# Patient Record
Sex: MALE | Race: WHITE
[De-identification: names, ages, dates, MRNs, and addresses within clinical notes are randomized per-mention and may not be internally consistent; named-entity substitution may affect disease eponyms.]

---

## 2019-11-30 ENCOUNTER — HOSPITAL ENCOUNTER (EMERGENCY)
Dept: HOSPITAL 75 - ER | Age: 39
Discharge: HOME | End: 2019-11-30
Payer: COMMERCIAL

## 2019-11-30 VITALS — SYSTOLIC BLOOD PRESSURE: 175 MMHG | DIASTOLIC BLOOD PRESSURE: 110 MMHG

## 2019-11-30 VITALS — HEIGHT: 70.87 IN | WEIGHT: 149.91 LBS | BODY MASS INDEX: 20.99 KG/M2

## 2019-11-30 DIAGNOSIS — Z79.4: ICD-10-CM

## 2019-11-30 DIAGNOSIS — J18.9: Primary | ICD-10-CM

## 2019-11-30 DIAGNOSIS — E10.9: ICD-10-CM

## 2019-11-30 LAB
ALBUMIN SERPL-MCNC: 3 GM/DL (ref 3.2–4.5)
ALP SERPL-CCNC: 86 U/L (ref 40–136)
ALT SERPL-CCNC: 40 U/L (ref 0–55)
BASOPHILS # BLD AUTO: 0 10^3/UL (ref 0–0.1)
BASOPHILS NFR BLD AUTO: 1 % (ref 0–10)
BILIRUB SERPL-MCNC: 0.4 MG/DL (ref 0.1–1)
BUN/CREAT SERPL: 11
CALCIUM SERPL-MCNC: 8.5 MG/DL (ref 8.5–10.1)
CHLORIDE SERPL-SCNC: 109 MMOL/L (ref 98–107)
CO2 SERPL-SCNC: 18 MMOL/L (ref 21–32)
CREAT SERPL-MCNC: 2.84 MG/DL (ref 0.6–1.3)
EOSINOPHIL # BLD AUTO: 0.1 10^3/UL (ref 0–0.3)
EOSINOPHIL NFR BLD AUTO: 1 % (ref 0–10)
ERYTHROCYTE [DISTWIDTH] IN BLOOD BY AUTOMATED COUNT: 14.2 % (ref 10–14.5)
GFR SERPLBLD BASED ON 1.73 SQ M-ARVRAT: 25 ML/MIN
GLUCOSE SERPL-MCNC: 48 MG/DL (ref 70–105)
HCT VFR BLD CALC: 28 % (ref 40–54)
HGB BLD-MCNC: 9.2 G/DL (ref 13.3–17.7)
LYMPHOCYTES # BLD AUTO: 1.7 X 10^3 (ref 1–4)
LYMPHOCYTES NFR BLD AUTO: 20 % (ref 12–44)
MANUAL DIFFERENTIAL PERFORMED BLD QL: NO
MCH RBC QN AUTO: 28 PG (ref 25–34)
MCHC RBC AUTO-ENTMCNC: 33 G/DL (ref 32–36)
MCV RBC AUTO: 86 FL (ref 80–99)
MONOCYTES # BLD AUTO: 0.7 X 10^3 (ref 0–1)
MONOCYTES NFR BLD AUTO: 9 % (ref 0–12)
NEUTROPHILS # BLD AUTO: 6 X 10^3 (ref 1.8–7.8)
NEUTROPHILS NFR BLD AUTO: 70 % (ref 42–75)
PLATELET # BLD: 265 10^3/UL (ref 130–400)
PMV BLD AUTO: 9.8 FL (ref 7.4–10.4)
POTASSIUM SERPL-SCNC: 5.4 MMOL/L (ref 3.6–5)
PROT SERPL-MCNC: 5.9 GM/DL (ref 6.4–8.2)
SODIUM SERPL-SCNC: 137 MMOL/L (ref 135–145)
WBC # BLD AUTO: 8.6 10^3/UL (ref 4.3–11)

## 2019-11-30 PROCEDURE — 85025 COMPLETE CBC W/AUTO DIFF WBC: CPT

## 2019-11-30 PROCEDURE — 96374 THER/PROPH/DIAG INJ IV PUSH: CPT

## 2019-11-30 PROCEDURE — 36415 COLL VENOUS BLD VENIPUNCTURE: CPT

## 2019-11-30 PROCEDURE — 71046 X-RAY EXAM CHEST 2 VIEWS: CPT

## 2019-11-30 PROCEDURE — 94640 AIRWAY INHALATION TREATMENT: CPT

## 2019-11-30 PROCEDURE — 80053 COMPREHEN METABOLIC PANEL: CPT

## 2019-11-30 PROCEDURE — 82962 GLUCOSE BLOOD TEST: CPT

## 2019-11-30 NOTE — ED COUGH/URI
General


Chief Complaint:  Cough/Cold/Flu Symptoms


Stated Complaint:  COUGH / CONGESTION / BACK PAIN


Nursing Triage Note:  


THE PT IS AMBULATORY TO THE ROOM WITHOUT DIFFICULTY. 











NO DISTRESS IS SEEN ON ARRIVAL. 











LOC IS NORMAL FOR THE PT. 











THE PT C/O OF  COUGH AND CHEST CONGESTION.


Sepsis Screen:  No Definite Risk


Source:  patient


Exam Limitations:  no limitations





History of Present Illness


Date Seen by Provider:  Nov 30, 2019


Time Seen by Provider:  17:29


Initial Comments


Cough congestion and back pain for a couple of days. Type I diabetic wearing an 

insulin pump.


Timing/Duration:  week, getting worse


Severity/Quality:  dry cough


Prior Episodes/Possible Cause:  occasional episodes


Modifying Factors:  Improves With Coughing


Associated Symptoms:  denies symptoms





Allergies and Home Medications


Allergies


Coded Allergies:  


     No Known Drug Allergies (Unverified , 11/30/19)





Patient Home Medication List


Home Medication List Reviewed:  Yes





Review of Systems


Review of Systems


Constitutional:  see HPI


EENTM:  see HPI


Respiratory:  see HPI, cough


Genitourinary:  no symptoms reported


Musculoskeletal:  no symptoms reported


Skin:  no symptoms reported


Psychiatric/Neurological:  No Symptoms Reported





Past Medical-Social-Family Hx


Patient Social History


Recent Foreign Travel:  No


Contact w/Someone Who Travel:  No


Recent Infectious Disease Expo:  No


Recent Hopitalizations:  No


Physical Abuse:  No


Sexual Abuse:  No


Mistreated:  No


Fear:  No





Seasonal Allergies


Seasonal Allergies:  No





Past Medical History


Surgeries:  No





Physical Exam





Vital Signs - First Documented








 11/30/19 11/30/19





 16:15 18:05


 


Temp 36.8 


 


Pulse 117 


 


Resp 16 


 


B/P (MAP) 175/105 (128) 


 


Pulse Ox  99


 


O2 Delivery  Room Air





Capillary Refill : Less Than 3 Seconds


Height: '"


Weight: lbs. oz. kg; 20.00 BMI


Method:


General Appearance:  WD/WN, no apparent distress


Eyes:  Bilateral Eye Normal Inspection, Bilateral Eye PERRL, Bilateral Eye EOMI


HEENT:  PERRL/EOMI, normal ENT inspection


Respiratory:  no respiratory distress, no accessory muscle use, other (crackles 

posterior left base, wheezing anterior right upper lobe)


Cardiovascular:  no murmur, tachycardia


Gastrointestinal:  normal bowel sounds, non tender, soft


Neurologic/Psychiatric:  alert, normal mood/affect, oriented x 3


Skin:  normal color, warm/dry





Progress/Results/Core Measures


Suspected Sepsis


Recent Fever Within 48 Hours:  No


Infection Criteria Present:  None


New/Unexplained  Altered Menta:  No


Sepsis Screen:  No Definite Risk


SIRS


Temperature: 


Pulse: 117 


Respiratory Rate: 16


 


Laboratory Tests


11/30/19 17:22: White Blood Count 8.6


Blood Pressure 175 /105 


Mean: 128


 


Laboratory Tests


11/30/19 17:22: 


Creatinine 2.84H, Platelet Count 265, Total Bilirubin 0.4








Results/Orders


Lab Results





Laboratory Tests








Test


 11/30/19


17:22 Range/Units


 


 


White Blood Count


 8.6 


 4.3-11.0


10^3/uL


 


Red Blood Count


 3.27 L


 4.35-5.85


10^6/uL


 


Hemoglobin 9.2 L 13.3-17.7  G/DL


 


Hematocrit 28 L 40-54  %


 


Mean Corpuscular Volume 86  80-99  FL


 


Mean Corpuscular Hemoglobin 28  25-34  PG


 


Mean Corpuscular Hemoglobin


Concent 33 


 32-36  G/DL





 


Red Cell Distribution Width 14.2  10.0-14.5  %


 


Platelet Count


 265 


 130-400


10^3/uL


 


Mean Platelet Volume 9.8  7.4-10.4  FL


 


Neutrophils (%) (Auto) 70  42-75  %


 


Lymphocytes (%) (Auto) 20  12-44  %


 


Monocytes (%) (Auto) 9  0-12  %


 


Eosinophils (%) (Auto) 1  0-10  %


 


Basophils (%) (Auto) 1  0-10  %


 


Neutrophils # (Auto) 6.0  1.8-7.8  X 10^3


 


Lymphocytes # (Auto) 1.7  1.0-4.0  X 10^3


 


Monocytes # (Auto) 0.7  0.0-1.0  X 10^3


 


Eosinophils # (Auto)


 0.1 


 0.0-0.3


10^3/uL


 


Basophils # (Auto)


 0.0 


 0.0-0.1


10^3/uL


 


Sodium Level 137  135-145  MMOL/L


 


Potassium Level 5.4 H 3.6-5.0  MMOL/L


 


Chloride Level 109 H   MMOL/L


 


Carbon Dioxide Level 18 L 21-32  MMOL/L


 


Anion Gap 10  5-14  MMOL/L


 


Blood Urea Nitrogen 31 H 7-18  MG/DL


 


Creatinine


 2.84 H


 0.60-1.30


MG/DL


 


Estimat Glomerular Filtration


Rate 25 


  





 


BUN/Creatinine Ratio 11   


 


Glucose Level 48 *L   MG/DL


 


Calcium Level 8.5  8.5-10.1  MG/DL


 


Corrected Calcium 9.3  8.5-10.1  MG/DL


 


Total Bilirubin 0.4  0.1-1.0  MG/DL


 


Aspartate Amino Transf


(AST/SGOT) 35 H


 5-34  U/L





 


Alanine Aminotransferase


(ALT/SGPT) 40 


 0-55  U/L





 


Alkaline Phosphatase 86    U/L


 


Total Protein 5.9 L 6.4-8.2  GM/DL


 


Albumin 3.0 L 3.2-4.5  GM/DL








My Orders





Orders - ROWENA JACOME APRN


Cbc With Automated Diff (11/30/19 17:24)


Comprehensive Metabolic Panel (11/30/19 17:24)


Ed Iv/Invasive Line Start (11/30/19 17:24)


Chest Pa/Lat (2 View) (11/30/19 17:24)


Albuterol/Ipra Inhalation Soln (Duoneb I (11/30/19 17:30)


Svn Small Volume Nebulizer (11/30/19 17:24)


Ns Iv 1000 Ml (Sodium Chloride 0.9%) (11/30/19 17:30)


Cho 60g/M 1snack ( [place]) (12/1/19 Lunch)


Rocephin 1 Gm Iv (1x Dose) (11/30/19 18:30)





Medications Given in ED





Current Medications








 Medications  Dose


 Ordered  Sig/Tiffanie


 Route  Start Time


 Stop Time Status Last Admin


Dose Admin


 


 Albuterol/


 Ipratropium  3 ml  ONCE  ONCE


 INH  11/30/19 17:30


 11/30/19 17:31 DC 11/30/19 18:04


3 ML








Vital Signs/I&O











 11/30/19 11/30/19





 16:15 18:05


 


Temp 36.8 


 


Pulse 117 


 


Resp 16 


 


B/P (MAP) 175/105 (128) 


 


Pulse Ox  99


 


O2 Delivery  Room Air





Capillary Refill : Less Than 3 Seconds








Blood Pressure Mean:                    128


POS








Departure


Impression





   Primary Impression:  


   Pneumonia


   Qualified Codes:  J18.1 - Lobar pneumonia, unspecified organism


Disposition:  01 HOME, SELF-CARE


Condition:  Stable





Departure-Patient Inst.


Decision time for Depature:  18:26


Referrals:  


[facility] (PCP/Family)


Primary Care Physician


Patient Instructions:  Pneumonia, Adult (DC)





Add. Discharge Instructions:  


1. Antibiotics as directed


2. Return to ER for any concerns


3. All discharge instructions reviewed with patient and/or family. Voiced und

erstanding.


Scripts


Albuterol Sulfate (PROAIR HFA) 1 Puff Puff


2 PUFF IH Q4H PRN for WEAKNESS, #1 PUFF


   1 PUFF = 90 MCG


   Prov: ROWENA JACOME         11/30/19 


Azithromycin (Azithromycin) 250 Mg Tablet


250 MG PO UD, #6 TAB


   TAKE 2 TABLETS ON DAY ONE THEN TAKE 1 TABLET DAILY FOR FOUR MORE DAYS


   Prov: ROWENA JACOME         11/30/19 


Amoxicillin/Potassium Clav (Augmentin 875-125 Tablet) 1 Each Tablet


1 EACH PO BID, #14 TAB 0 Refills


   Prov: ROWENA JACOME         11/30/19











ROWENA JACOME             Nov 30, 2019 17:30


POS

## 2019-11-30 NOTE — DIAGNOSTIC IMAGING REPORT
INDICATION: Cough and congestion.



PA and lateral chest.



Heart size and pulmonary vascularity are normal. Lungs are clear.

There are no effusions or pneumothoraces.



IMPRESSION: Negative chest.



Dictated by: 



  Dictated on workstation # [id number]

## 2019-12-26 NOTE — XMS REPORT
[facility]

                             Created on: 2018



Jose Flores

External Reference #: [id number]

: 1980

Sex: Female



Demographics





                          Address                   [address] [place]  [address]

 

                          Preferred Language        Unknown

 

                          Marital Status            Unknown

 

                          Jew Affiliation     Unknown

 

                          Race                      Unknown

 

                          Ethnic Group              Unknown





Author





                          Author                    Jose CARLIN

 

                          Organization              [facility]

 

                          Address                   [address] [place]  [address]



 

                          Phone                     [phone]







Care Team Providers





                    Care Team Member Name Role                Phone

 

                    INSHA CARLIN     Unavailable         [phone]







PROBLEMS





          Type      Condition ICD9-CM Code RBI30-WW Code Onset Dates Condition S

tatus SNOMED 

Code

 

          Problem   High risk sexual behavior           Z72.51              Acti

ve    [id number]

 

          Problem   Tinea versicolor           B36.0               Active    564

28369

 

           Problem    Other specified diabetes mellitus without complications   

         E13.9                 Active

                                        [id number]

 

           Problem    Erectile dysfunction, unspecified erectile dysfunction typ

e            N52.9                 

Active                                  [id number]

 

          Problem   Hyperlipidemia, unspecified hyperlipidemia type           E7

8.5               Active    

[id number]







ALLERGIES

No Information



ENCOUNTERS





                Encounter       Location        Date            Diagnosis

 

                          [facility]     [id number]

[address] [place] [phone]

                          07 Mar, 2018               

 

                          [facility]     [address] [id number]

[address] [place] [phone]

                                         

 

                          [facility]     [address] [id number]

[address] [place] [phone]

                                         

 

                          [facility]     [address]

[address] [place] [phone]

                                         

 

                          [facility]     [address]

[address] [place] [phone]

                          22 Dec, 2017               

 

                          [facility]     [address] [id number]

[address] [place] [phone]

                          07 Dec, 2017               

 

                          [facility]     [address] [id number]

[address] [place] [phone]

                                         

 

                          [facility]     [address] [id number]

[address] [place] [phone]

                          15 Nov, 2017              Tinea versicolor B36.0 ; Enc

ounter for immunization Z23 ; Other 

specified diabetes mellitus without complications E13.9 and High risk sexual 
behavior Z72.51

 

                          [facility]     [address] [id number]

[address] [place] [phone]

                          16 Oct, 2017               

 

                          [facility]     [address] [id number]

[address] [place] [phone]

                          22 Sep, 2017               

 

                          [facility]     [address] [id number]

[address] [place] [phone]

                          19 Sep, 2017               

 

                          [facility]     [address] [id number]

[address] [place] [phone]

                          19 Sep, 2017               

 

                          [facility]     [address] [id number]

[address] [place] [phone]

                          08 Sep, 2017               

 

                          [facility]     [address] [id number]

[address] [place] [phone]

                          31 Aug, 2017               

 

                          [facility]     [address] [id number]

[address] [place] [phone]

                          23 Aug, 2017               

 

                          [facility]     [address] [id number]

[address] [place] [phone]

                          23 Aug, 2017              Tinea versicolor B36.0 ; Oth

er specified diabetes mellitus without 

complications E13.9 and Acute pain of right shoulder M25.511

 

                          [facility]     [address] [id number]

[address] [place] [phone]

                          17 Aug, 2017               

 

                          [facility]     [address] [id number]

[address] [place] [phone]

                          17 Aug, 2017               

 

                          [facility]     [address] [id number]

[address] [place] [phone]

                                         

 

                          [facility]     [address] [id number]

[address] [place] [phone]

                                         

 

                          [facility]     [address] [id number]

[address] [place] [phone]

                                         

 

                          [facility]     [address] [id number]

[address] [place] [phone]

                                         

 

                          [facility]     [address] [facility] [id number]

[address] [place] [phone]

                                         

 

                          [facility]     [address] [facility] [id number]

[address] [place] [phone]

                                        Other specified diabetes steven

litus without complications E13.9 ; 

Erectile dysfunction, unspecified erectile dysfunction type N52.9 ; 
Hyperlipidemia, unspecified hyperlipidemia type E78.5 ; Tinea versicolor B36.0 
and High risk sexual behavior Z72.51

 

                          [facility]     [address] [facility] [id number]

[address] [place] [phone]

                                         







IMMUNIZATIONS

No Known Immunizations



SOCIAL HISTORY

Never Assessed



REASON FOR VISIT

PALS IN-[place]



PLAN OF CARE





VITAL SIGNS





MEDICATIONS

Unknown Medications



RESULTS

No Results



PROCEDURES

No Known procedures



INSTRUCTIONS





MEDICATIONS ADMINISTERED

No Known Medications



MEDICAL (GENERAL) HISTORY





                    Type                Description         Date

 

                    Medical History     diabetes mellitus    

 

                    Medical History     hyperlipidemia       

 

                    Medical History     erectile dysfunction  

 

                    Medical History     anxiety attacks      

 

                    Medical History     depression           

 

                    Surgical History    tonsillectomy and adenoidectomy 

 

                    Surgical History    wisdom teeth extraction  

 

                    Surgical History    Lumpectomy on Tongue  

 

                    Surgical History    mole removal -back   

 

                    Hospitalization History Flu- Hospitalized at [facility] in 

[place], [place]

## 2019-12-26 NOTE — XMS REPORT
[facility]

                             Created on: 2018



Jose Flores

External Reference #: [id number]

: 1980

Sex: Female



Demographics





                          Address                   [address] [place]  [address]

 

                          Preferred Language        Unknown

 

                          Marital Status            Unknown

 

                          Mandaen Affiliation     Unknown

 

                          Race                      Unknown

 

                          Ethnic Group              Unknown





Author





                          Author                    Jose CARLIN

 

                          Organization              [facility]

 

                          Address                   [address] [place]  [address]



 

                          Phone                     [phone]







Care Team Providers





                    Care Team Member Name Role                Phone

 

                    NISHA CARLIN     Unavailable         [phone]







PROBLEMS





          Type      Condition ICD9-CM Code VCP65-AF Code Onset Dates Condition S

tatus SNOMED 

Code

 

          Problem   High risk sexual behavior           Z72.51              Acti

ve    [id number]

 

          Problem   Tinea versicolor           B36.0               Active    564

87633

 

           Problem    Other specified diabetes mellitus without complications   

         E13.9                 Active

                                        [id number]

 

           Problem    Erectile dysfunction, unspecified erectile dysfunction typ

e            N52.9                 

Active                                  [id number]

 

          Problem   Hyperlipidemia, unspecified hyperlipidemia type           E7

8.5               Active    

[id number]







ALLERGIES

No Information



ENCOUNTERS





                Encounter       Location        Date            Diagnosis

 

                          [facility]     [address] [id number]

[address] [place] [phone]

                          07 Mar, 2018               

 

                          [facility]     [address] [id number]

[address] [place] [phone]

                                         

 

                          [facility]     [address] [id number]

[address] [place] [phone]

                                         

 

                          [facility]     [address]

[address] [place] [phone]

                                         

 

                          [facility]     [address] [id number]

[address] [place] [phone]

                          22 Dec, 2017               

 

                          [facility]     [address] [id number]

[address] [place] [phone]

                          07 Dec, 2017               

 

                          [facility]     [address] [id number]

[address] [place] [phone]

                                         

 

                          [facility]     [address] [id number]

[address] [place] [phone]

                          15 Nov, 2017              Tinea versicolor B36.0 ; Enc

ounter for immunization Z23 ; Other 

specified diabetes mellitus without complications E13.9 and High risk sexual 
behavior Z72.51

 

                          [facility]     [address] [id number]

[address] [place] [phone]

                          16 Oct, 2017               

 

                          [facility]     [address] [id number]

[address] [place] [phone]

                          22 Sep, 2017               

 

                          [facility]     [address] [id number]

[address] [place] [phone]

                          19 Sep, 2017               

 

                          [facility]     [address] [id number]

[address] [place] [phone]

                          19 Sep, 2017               

 

                          [facility]     [address] [id number]

[address] [place] [phone]

                          08 Sep, 2017               

 

                          [facility]     [address] [id number]

[address] [place] [phone]

                          31 Aug, 2017               

 

                          [facility]     [address] [id number]

[address] [place] [phone]

                          23 Aug, 2017               

 

                          [facility]     [address] [id number]

[address] [place] [phone]

                          23 Aug, 2017              Tinea versicolor B36.0 ; Oth

er specified diabetes mellitus without 

complications E13.9 and Acute pain of right shoulder M25.511

 

                          [facility]     [address] [id number]

[address] [place] [phone]

                          17 Aug, 2017               

 

                          [facility]     [address] [id number]

[address] [place] [phone]

                          17 Aug, 2017               

 

                          [facility]     [address] [id number]

[address] [place] [phone]

                                         

 

                          [facility]     [address] [id number]

[address] [place] [phone]

                                         

 

                          [facility]     [address] [id number]

[address] [place] [phone]

                                         

 

                          [facility]     [address] [id number]

[address] [place] [phone]

                                         

 

                          [facility]     [address] [facility] [id number]

[address] [place] [phone]

                                         

 

                          [facility]     [address] [facility] [id number]

[address] [place] [phone]

                                        Other specified diabetes steven

litus without complications E13.9 ; 

Erectile dysfunction, unspecified erectile dysfunction type N52.9 ; 
Hyperlipidemia, unspecified hyperlipidemia type E78.5 ; Tinea versicolor B36.0 
and High risk sexual behavior Z72.51

 

                          [facility]     [address] [facility] [id number]

[address] [place] [phone]

                                         







IMMUNIZATIONS

No Known Immunizations



SOCIAL HISTORY

Never Assessed



REASON FOR VISIT

eye exam



PLAN OF CARE





VITAL SIGNS





MEDICATIONS

Unknown Medications



RESULTS

No Results



PROCEDURES

No Known procedures



INSTRUCTIONS





MEDICATIONS ADMINISTERED

No Known Medications



MEDICAL (GENERAL) HISTORY





                    Type                Description         Date

 

                    Medical History     diabetes mellitus    

 

                    Medical History     hyperlipidemia       

 

                    Medical History     erectile dysfunction  

 

                    Medical History     anxiety attacks      

 

                    Medical History     depression           

 

                    Surgical History    tonsillectomy and adenoidectomy 

 

                    Surgical History    wisdom teeth extraction  

 

                    Surgical History    Lumpectomy on Tongue  

 

                    Surgical History    mole removal -back   

 

                    Hospitalization History Flu- Hospitalized at [facility] in 

[place], [place]

## 2019-12-26 NOTE — XMS REPORT
Continuity of Care Document

                             Created on: 2019



Jose Mcmullen

External Reference #: [id number]

: 1980

Sex: Male



Demographics





                          Address                   [address] [place] [address]  [address]

 

                          Home Phone                [phone] x

 

                          Preferred Language        Unknown

 

                          Marital Status            Unknown

 

                          Episcopalian Affiliation     Unknown

 

                          Race                      Unknown

 

                          Ethnic Group              Unknown





Author





                          Organization              Unknown

 

                          Address                   Unknown

 

                          Phone                     Unavailable



              



Allergies

      



             Active           Description           Code           Type         

  Severity   

                Reaction           Onset           Reported/Identified          

 

Relationship to Patient                 Clinical Status        

 

                Yes             No Known Drug Allergies           [id number]    

       Drug 

Allergy           Unknown           N/A                             2019  

      

                                                             



                  



Medications

      



There is no data.                  



Problems

      



             Date Dx Coded           Attending           Type           Code    

       

Diagnosis                               Diagnosed By        

 

             2019           ROWENA JACOME           Ot           E10

.9           

TYPE 1 DIABETES MELLITUS WITHOUT COMPLIC                    

 

             2019           ROWENA JACOME           Ot           J18

.9           

PNEUMONIA, UNSPECIFIED ORGANISM                    

 

             2019           ROWENA JACOME           Ot           R05

           

COUGH                                            

 

             2019           ROWENA JACOME           Ot           Z79

.4           

LONG TERM (CURRENT) USE OF INSULIN                    



                        



Procedures

      



There is no data.                  



Results

      



                    Test                Result              Range        

 

                                        Complete blood count (CBC) with automate

d white blood cell (WBC) differential - 

19 17:22         

 

                          Blood leukocytes automated count (number/volume)      

     8.6 10*3/uL          

                                        4.3-11.0        

 

                          Blood erythrocytes automated count (number/volume)    

       3.27 10*6/uL       

                                        4.35-5.85        

 

                    Venous blood hemoglobin measurement (mass/volume)           

9.2 g/dL            

13.3-17.7        

 

                    Blood hematocrit (volume fraction)           28 %           

     40-54        

 

                    Automated erythrocyte mean corpuscular volume           86 [

foz_us]           

80-99        

 

                                        Automated erythrocyte mean corpuscular h

emoglobin (mass per erythrocyte)        

                          28 pg                     25-34        

 

                                        Automated erythrocyte mean corpuscular h

emoglobin concentration measurement 

(mass/volume)             33 g/dL                   32-36        

 

                    Automated erythrocyte distribution width ratio           14.

2 %              10.0-

14.5        

 

                    Automated blood platelet count (count/volume)           265 

10*3/uL           

130-400        

 

                          Automated blood platelet mean volume measurement      

     9.8 [foz_us]         

                                        7.4-10.4        

 

                    Automated blood neutrophils/100 leukocytes           70 %   

             42-75       

 

 

                    Automated blood lymphocytes/100 leukocytes           20 %   

             12-44       

 

 

                    Blood monocytes/100 leukocytes           9 %                

 0-12        

 

                    Automated blood eosinophils/100 leukocytes           1 %    

             0-10        

 

                    Automated blood basophils/100 leukocytes           1 %      

           0-10        

 

                    Blood neutrophils automated count (number/volume)           

6.0 10*3            

1.8-7.8        

 

                    Blood lymphocytes automated count (number/volume)           

1.7 10*3            

1.0-4.0        

 

                    Blood monocytes automated count (number/volume)           0.

7 10*3            

0.0-1.0        

 

                    Automated eosinophil count           0.1 10*3/uL           0

.0-0.3        

 

                    Automated blood basophil count (count/volume)           0.0 

10*3/uL           

0.0-0.1        

 

                                        Comprehensive metabolic panel - 19

 17:22         

 

                          Serum or plasma sodium measurement (moles/volume)     

      137 mmol/L          

                                        135-145        

 

                          Serum or plasma potassium measurement (moles/volume)  

         5.4 mmol/L       

                                        3.6-5.0        

 

                          Serum or plasma chloride measurement (moles/volume)   

        109 mmol/L        

                                                

 

                    Carbon dioxide           18 mmol/L           21-32        

 

                          Serum or plasma anion gap determination (moles/volume)

           10 mmol/L      

                                        5-14        

 

                          Serum or plasma urea nitrogen measurement (mass/volume

)           31 mg/dL      

                                        7-18        

 

                          Serum or plasma creatinine measurement (mass/volume)  

         2.84 mg/dL       

                                        0.60-1.30        

 

                    Serum or plasma urea nitrogen/creatinine mass ratio         

  11                  NRG 

       

 

                                        Serum or plasma creatinine measurement w

ith calculation of estimated glomerular 

filtration rate           25                        NRG        

 

                    Serum or plasma glucose measurement (mass/volume)           

48 mg/dL            

        

 

                    Serum or plasma calcium measurement (mass/volume)           

8.5 mg/dL           

8.5-10.1        

 

                          Serum or plasma total bilirubin measurement (mass/volu

me)           0.4 mg/dL   

                                        0.1-1.0        

 

                                        Serum or plasma alkaline phosphatase emy

surement (enzymatic activity/volume)    

                          86 U/L                            

 

                                        Serum or plasma aspartate aminotransfera

se measurement (enzymatic 

activity/volume)           35 U/L                    5-34        

 

                                        Serum or plasma alanine aminotransferase

 measurement (enzymatic activity/volume)

                          40 U/L                    0-55        

 

                    Serum or plasma protein measurement (mass/volume)           

5.9 g/dL            

6.4-8.2        

 

                    Serum or plasma albumin measurement (mass/volume)           

3.0 g/dL            

3.2-4.5        

 

                    CALCIUM CORRECTED           9.3 mg/dL           8.5-10.1    

    

 

                                        Capillary blood glucose measurement by g

lucometer (mass/volume) - 19 19:34

         

 

                          Capillary blood glucose measurement by glucometer (mas

s/volume)           62 

mg/dL                                           

 

                                        Capillary blood glucose measurement by g

lucometer (mass/volume) - 19 20:07

         

 

                          Capillary blood glucose measurement by glucometer (mas

s/volume)           98 

mg/dL                                           

 

                                        CBC - 19 15:04         

 

                    WHITE BLOOD CELL COUNT           8.8 Thousand/uL           3

.8-10.8        

 

                    RED BLOOD CELL COUNT           3.11 Million/uL           4.2

0-5.80        

 

                    HEMOGLOBIN           9.0 g/dL            13.2-17.1        

 

                    HEMATOCRIT           27.8 %              38.5-50.0        

 

                    MCV                 89.4 fL             80.0-100.0        

 

                    MCH                 28.9 pg             27.0-33.0        

 

                    MCHC                32.4 g/dL           32.0-36.0        

 

                    RDW                 14.3 %              11.0-15.0        

 

                    PLATELET COUNT           339 Thousand/uL           140-400  

      

 

                    MPV                 10.5 fL             7.5-12.5        

 

                    ABSOLUTE NEUTROPHILS           4981 cells/uL           1500-

7800        

 

                    ABSOLUTE LYMPHOCYTES           2561 cells/uL           850-3

900        

 

                    ABSOLUTE MONOCYTES           651 cells/uL           200-950 

       

 

                    ABSOLUTE EOSINOPHILS           510 cells/uL           

        

 

                    ABSOLUTE BASOPHILS           97 cells/uL           0-200    

    

 

                    NEUTROPHILS           56.6 %              NRG        

 

                    LYMPHOCYTES           29.1 %              NRG        

 

                    MONOCYTES           7.4 %               NRG        

 

                    EOSINOPHILS           5.8 %               NRG        

 

                    BASOPHILS           1.1 %               NRG        



                        



Encounters

      



                ACCT No.           Visit Date/Time           Discharge          

 Status         

             Pt. Type           Provider           Facility           Loc./Unit 

          

Complaint        

 

             [id number]           2019 10:00:00                        [facility]    

       

Outpatient           REMINGTON PINZON, NISHA HENRY [facility]                                             

 

             [id number]           2019 13:20:00                              

       Document

 Registration                                                                   

 

 

                    [id number]           2019 16:00:00           

019 20:21:00        

                DIS             Outpatient           ROWENA JACOME        

   [facility]           ER                        COUGH / CONGESTION / BA

CK PAIN

## 2019-12-26 NOTE — XMS REPORT
[facility]

                             Created on: 2018



Jose Flores

External Reference #: [id number]

: 1980

Sex: Female



Demographics





                          Address                   [address] [place]  [address]

 

                          Preferred Language        Unknown

 

                          Marital Status            Unknown

 

                          Gnosticism Affiliation     Unknown

 

                          Race                      Unknown

 

                          Ethnic Group              Unknown





Author





                          Author                    Jose CARLIN

 

                          Organization              [facility]

 

                          Address                   [address] [place]  [address]



 

                          Phone                     [phone]







Care Team Providers





                    Care Team Member Name Role                Phone

 

                    NISHA CARLIN     Unavailable         [phone]







PROBLEMS





          Type      Condition ICD9-CM Code YPV20-VZ Code Onset Dates Condition S

tatus SNOMED 

Code

 

          Problem   High risk sexual behavior           Z72.51              Acti

ve    [id number]

 

          Problem   Tinea versicolor           B36.0               Active    [id number]

[id number]

 

           Problem    Other specified diabetes mellitus without complications   

         E13.9                 Active

                                        [id number]

 

           Problem    Erectile dysfunction, unspecified erectile dysfunction typ

e            N52.9                 

Active                                  [id number]

 

          Problem   Hyperlipidemia, unspecified hyperlipidemia type           E7

8.5               Active    

[id number]







ALLERGIES

No Information



ENCOUNTERS





                Encounter       Location        Date            Diagnosis

 

                          [facility]     [id number]

[address] [place] [phone]

                          07 Mar, 2018               

 

                          [facility]     [id number]

[address] [place] [phone]

                                         

 

                          [facility]     [address] [id number]

[address] [place] [phone]

                                         

 

                          [facility]     [id number]

[address] [place] [phone]

                                         

 

                          [facility]     [address] [id number]

[address] [place] [phone]

                          22 Dec, 2017               

 

                          [facility]     [id number]

[address] [place] [phone]

                          07 Dec, 2017               

 

                          [facility]     [address] [id number]

[address] [place] [phone]

                                         

 

                          [facility]     [address] [id number]

[address] [place] [phone]

                          15 Nov, 2017              Tinea versicolor B36.0 ; Enc

ounter for immunization Z23 ; Other 

specified diabetes mellitus without complications E13.9 and High risk sexual 
behavior Z72.51

 

                          [facility]     [address] [id number]

[address] [place] [phone]

                          16 Oct, 2017               

 

                          [facility]     [address] [id number]

[address] [place] [phone]

                          22 Sep, 2017               

 

                          [facility]     [address] [id number]

[address] [place] [phone]

                          19 Sep, 2017               

 

                          [facility]     [address] [id number]

[address] [place] [phone]

                          19 Sep, 2017               

 

                          [facility]     [address] [id number]

[address] [place] [phone]

                          08 Sep, 2017               

 

                          [facility]     [address] [id number]

[address] [place] [phone]

                          31 Aug, 2017               

 

                          [facility]     [address] [id number]

[address] [place] [phone]

                          23 Aug, 2017               

 

                          [facility]     [address] [id number]

[address] [place] [phone]

                          23 Aug, 2017              Tinea versicolor B36.0 ; Oth

er specified diabetes mellitus without 

complications E13.9 and Acute pain of right shoulder M25.511

 

                          [facility]     [address] [id number]

[address] [place] [phone]

                          17 Aug, 2017               

 

                          [facility]     [address] [id number]

[address] [place] [phone]

                          17 Aug, 2017               

 

                          [facility]     [address] [id number]

[address] [place] [phone]

                                         

 

                          [facility]     [address] [id number]

[address] [place] [phone]

                                         

 

                          [facility]     [address] [id number]

[address] [place] [phone]

                                         

 

                          [facility]     [address] [id number]

[address] [place] [phone]

                                         

 

                          [facility]     [address] [facility] [id number]

[address] [place] [phone]

                                         

 

                          [facility]     [address] [facility] [id number]

[address] [place] [phone]

                                        Other specified diabetes steven

litus without complications E13.9 ; 

Erectile dysfunction, unspecified erectile dysfunction type N52.9 ; 
Hyperlipidemia, unspecified hyperlipidemia type E78.5 ; Tinea versicolor B36.0 
and High risk sexual behavior Z72.51

 

                          [facility]     [address] [facility] [id number]

[address] [place] [phone]

                                         







IMMUNIZATIONS

No Known Immunizations



SOCIAL HISTORY

Never Assessed



REASON FOR VISIT

PALS IN-[employer]



PLAN OF CARE





VITAL SIGNS





MEDICATIONS

Unknown Medications



RESULTS

No Results



PROCEDURES

No Known procedures



INSTRUCTIONS





MEDICATIONS ADMINISTERED

No Known Medications



MEDICAL (GENERAL) HISTORY





                    Type                Description         Date

 

                    Medical History     diabetes mellitus    

 

                    Medical History     hyperlipidemia       

 

                    Medical History     erectile dysfunction  

 

                    Medical History     anxiety attacks      

 

                    Medical History     depression           

 

                    Surgical History    tonsillectomy and adenoidectomy 

 

                    Surgical History    wisdom teeth extraction  

 

                    Surgical History    Lumpectomy on Tongue  

 

                    Surgical History    mole removal -back   

 

                    Hospitalization History Flu- Hospitalized at [facility] in 

[place], [place]

## 2019-12-26 NOTE — XMS REPORT
[facility]

                             Created on: 2018



Jose Flores

External Reference #: [id number]

: 1980

Sex: Female



Demographics





                          Address                   [address] [place]  [address]

 

                          Preferred Language        Unknown

 

                          Marital Status            Unknown

 

                          Sabianist Affiliation     Unknown

 

                          Race                      Unknown

 

                          Ethnic Group              Unknown





Author





                          Author                    Jose CARLIN

 

                          Organization              [facility]

 

                          Address                   [address] [place]  [address]



 

                          Phone                     [phone]







Care Team Providers





                    Care Team Member Name Role                Phone

 

                    NISHA CARLIN     Unavailable         [phone]







PROBLEMS





          Type      Condition ICD9-CM Code AYN25-KA Code Onset Dates Condition S

tatus SNOMED 

Code

 

          Problem   High risk sexual behavior           Z72.51              Acti

ve    [id number]

 

          Problem   Tinea versicolor           B36.0               Active    564

05744

 

           Problem    Other specified diabetes mellitus without complications   

         E13.9                 Active

                                        [id number]

 

           Problem    Erectile dysfunction, unspecified erectile dysfunction typ

e            N52.9                 

Active                                  [id number]

 

          Problem   Hyperlipidemia, unspecified hyperlipidemia type           E7

8.5               Active    

[id number]







ALLERGIES

No Information



ENCOUNTERS





                Encounter       Location        Date            Diagnosis

 

                          [facility]     [address] [id number]

[address] [place] [address]

                          07 Mar, 2018               

 

                          [facility]     [address] [id number]

[address] [place] [address]

                                         

 

                          [facility]     [address] [id number]

[address] [place] [address]

                                         

 

                          [facility]     [address] [id number]

[address] [place] [address]

                                         

 

                          [facility]     [address] [id number]

[address] [place] [address]

                          22 Dec, 2017               

 

                          [facility]     [address] [id number]

[address] [place] [address]

                          07 Dec, 2017               

 

                          [facility]     [address] [id number]

[address] [place] [address]

                                         

 

                          [facility]     [address] [id number]

[address] [place] [address]

                          15 Nov, 2017              Tinea versicolor B36.0 ; Enc

ounter for immunization Z23 ; Other 

specified diabetes mellitus without complications E13.9 and High risk sexual 
behavior Z72.51

 

                          [facility]     [address] [id number]

[address] [place] [phone]

                          16 Oct, 2017               

 

                          [facility]     [address] [id number]

[address] [place] [phone]

                          22 Sep, 2017               

 

                          [facility]     [address] [id number]

[address] [place] [phone]

                          19 Sep, 2017               

 

                          [facility]     [address] [id number]

[address] [place] [address]

                          19 Sep, 2017               

 

                          [facility]     [address] [id number]

[address] [place] [address]

                          08 Sep, 2017               

 

                          [facility]     [address] [id number]

[address] [place] [address]

                          31 Aug, 2017               

 

                          [facility]     [address] [id number]

[address] [place] [phone]

                          23 Aug, 2017               

 

                          [facility]     [address] [id number]

[address] [place] [address]

                          23 Aug, 2017              Tinea versicolor B36.0 ; Oth

er specified diabetes mellitus without 

complications E13.9 and Acute pain of right shoulder M25.511

 

                          [facility]     [address] [id number]

[address] [place] [address]

                          17 Aug, 2017               

 

                          [facility]     [address] [id number]

[address] [place] [address]

                          17 Aug, 2017               

 

                          [facility]     [address] [id number]

[address] [place] [phone]

                                         

 

                          [facility]     [address] [id number]

[address] [place] [address]

                                         

 

                          [facility]     [address] [id number]

[address] [place] [address]

                                         

 

                          [facility]     [address] [id number]

[address] [place] [address]

                                         

 

                          [facility]     [address] [facility] [id number]

[address] [place] [address]

                                         

 

                          [facility]     [address] [facility] [id number]

[address] [place] [address]

                                        Other specified diabetes steven

litus without complications E13.9 ; 

Erectile dysfunction, unspecified erectile dysfunction type N52.9 ; 
Hyperlipidemia, unspecified hyperlipidemia type E78.5 ; Tinea versicolor B36.0 
and High risk sexual behavior Z72.51

 

                          [facility]     [address] [facility] [id number]

[address] [place] [address]

                                         







IMMUNIZATIONS

No Known Immunizations



SOCIAL HISTORY

Never Assessed



REASON FOR VISIT

FYI only



PLAN OF CARE





VITAL SIGNS





MEDICATIONS

Unknown Medications



RESULTS

No Results



PROCEDURES

No Known procedures



INSTRUCTIONS





MEDICATIONS ADMINISTERED

No Known Medications



MEDICAL (GENERAL) HISTORY





                    Type                Description         Date

 

                    Medical History     diabetes mellitus    

 

                    Medical History     hyperlipidemia       

 

                    Medical History     erectile dysfunction  

 

                    Medical History     anxiety attacks      

 

                    Medical History     depression           

 

                    Surgical History    tonsillectomy and adenoidectomy 

 

                    Surgical History    wisdom teeth extraction  

 

                    Surgical History    Lumpectomy on Tongue  

 

                    Surgical History    mole removal -back   

 

                    Hospitalization History Flu- Hospitalized at [facility] in 

[place], [place]

## 2019-12-26 NOTE — XMS REPORT
[facility]

                             Created on: 2018



Jose Flores

External Reference #: [id number]

: 1980

Sex: Female



Demographics





                          Address                   [address] [place]  [address]

 

                          Preferred Language        Unknown

 

                          Marital Status            Unknown

 

                          Yarsani Affiliation     Unknown

 

                          Race                      Unknown

 

                          Ethnic Group              Unknown





Author





                          Author                    Jose CARLIN

 

                          Organization              [facility]

 

                          Address                   [address] [place]  [address]



 

                          Phone                     [phone]







Care Team Providers





                    Care Team Member Name Role                Phone

 

                    NISHA CARLIN     Unavailable         [phone]







PROBLEMS





          Type      Condition ICD9-CM Code IIC34-FC Code Onset Dates Condition S

tatus SNOMED 

Code

 

          Problem   High risk sexual behavior           Z72.51              Acti

ve    [id number]

 

          Problem   Tinea versicolor           B36.0               Active    564

91980

 

           Problem    Other specified diabetes mellitus without complications   

         E13.9                 Active

                                        [id number]

 

           Problem    Erectile dysfunction, unspecified erectile dysfunction typ

e            N52.9                 

Active                                  [id number]

 

          Problem   Hyperlipidemia, unspecified hyperlipidemia type           E7

8.5               Active    

[id number]







ALLERGIES





             Substance    Reaction     Event Type   Date         Status

 

             Latex        Unknown      Drug Allergy  Active

 

             Simvastatin  enlarged liver  Drug Allergy  Active







ENCOUNTERS





                Encounter       Location        Date            Diagnosis

 

                          [facility]     [address] [facility] [id number]

[address] [place] [phone]

                          07 Mar, 2018               

 

                          [facility]     [address] [facility] [id number]

[address] [place] [phone]

                                         

 

                          [facility]     [address] [id number]

[address] [place] [phone]

                                         

 

                          [facility]     [address] [id number]

[address] [place] [phone]

                                         

 

                          [facility]     [address] [id number]

[address] [place] [address]

                          22 Dec, 2017               

 

                          [facility]     [address] [facility] [id number]

[address] [place] [phone]

                          07 Dec, 2017               

 

                          [facility]     [address] [facility] [id number]

[address] [place] [phone]

                                         

 

                          [facility]     [address] [id number]

[address] [place] [address]

                          15 Nov, 2017              Tinea versicolor B36.0 ; Enc

ounter for immunization Z23 ; Other 

specified diabetes mellitus without complications E13.9 and High risk sexual 
behavior Z72.51

 

                          [facility]     [address] [id number]

[address] [place] [address]

                          16 Oct, 2017               

 

                          [facility]     [address] [id number]

[address] [place] [phone]

                          22 Sep, 2017               

 

                          [facility]     [address] [id number]

[address] [place] [phone]

                          19 Sep, 2017               

 

                          [facility]     [address] [id number]

[address] [place] [phone]

                          19 Sep, 2017               

 

                          [facility]     [address] [id number]

[address] [place] [phone]

                          08 Sep, 2017               

 

                          [facility]     [address] [id number]

[address] [place] [phone]

                          31 Aug, 2017               

 

                          [facility]     [address] [id number]

[address] [place] [phone]

                          23 Aug, 2017               

 

                          [facility]     [address] [id number]

[address] [place] [address]

                          23 Aug, 2017              Tinea versicolor B36.0 ; Oth

er specified diabetes mellitus without 

complications E13.9 and Acute pain of right shoulder M25.511

 

                          [facility]     [address] [id number]

[address] [place] [phone]

                          17 Aug, 2017               

 

                          [facility]     [address] [id number]

[address] [place] [address]

                          17 Aug, 2017               

 

                          [facility]     [address] [facility] [id number]

[address] [place] [phone]

                                         

 

                          [facility]     [address] [id number]

[address] [place] [address]

                                         

 

                          [facility]     [address] [facility] [id number]

[address] [place] [phone]

                                         

 

                          [facility]     [address] [facility] [id number]

[address] [place] [address]

                                         

 

                          [facility]     [address] [facility] [id number]

[address] [place] [phone]

                                         

 

                          [facility]     [address] [facility] [id number]

[address] [place] [phone]

                                        Other specified diabetes steven

litus without complications E13.9 ; 

Erectile dysfunction, unspecified erectile dysfunction type N52.9 ; 
Hyperlipidemia, unspecified hyperlipidemia type E78.5 ; Tinea versicolor B36.0 
and High risk sexual behavior Z72.51

 

                          [facility]     [address] [facility] [id number]

[address] [place] [phone]

                                         







IMMUNIZATIONS

No Known Immunizations



SOCIAL HISTORY

Never Assessed



REASON FOR VISIT

PMH Obtained -CLRN 



PLAN OF CARE





VITAL SIGNS





MEDICATIONS





        Medication Instructions Dosage  Frequency Start Date End Date Duration S

tatus

 

        Meloxicam 10 MG Orally Once a day 1 capsule 24h                         

    Active

 

        Multi Vitamin - Orally Once a day 1 tablet 24h                          

   Active

 

        NovoLog Flexpen 100 UNIT/ML Subcutaneous per sliding scale              

                           Active

 

        Viagra 100 MG Orally Once a day 1 tablet as needed 24h                  

           Active

 

          Toujeo SoloStar 300 UNIT/ML Subcutaneous sliding scale per blood sugar

                                          

                                        Active

 

        Probiotic -                                                 Active

 

        Fish Oil 1000 MG Orally Once a day 1 capsule 24h                        

     Active

 

        ASA             1 tab                                   Active

 

        ReliOn Blood Glucose Test - In Vitro 4 times a day         6h           

                   Active

 

        Niacin 500 MG Orally Once a day 1 tablet with food 24h                  

           Active







RESULTS

No Results



PROCEDURES

No Known procedures



INSTRUCTIONS





MEDICATIONS ADMINISTERED

No Known Medications



MEDICAL (GENERAL) HISTORY





                    Type                Description         Date

 

                    Medical History     diabetes mellitus    

 

                    Medical History     hyperlipidemia       

 

                    Medical History     erectile dysfunction  

 

                    Medical History     anxiety attacks      

 

                    Medical History     depression           

 

                    Surgical History    tonsillectomy and adenoidectomy 

 

                    Surgical History    wisdom teeth extraction  

 

                    Surgical History    Lumpectomy on Tongue  

 

                    Surgical History    mole removal -back   

 

                    Hospitalization History Flu- Hospitalized at [facility] in 

[place], [place]

## 2019-12-26 NOTE — XMS REPORT
[facility]

                             Created on: 2018



Jose Flores

External Reference #: [id number]

: 1980

Sex: Female



Demographics





                          Address                   [address] [place]  [address]

 

                          Preferred Language        Unknown

 

                          Marital Status            Unknown

 

                          Adventist Affiliation     Unknown

 

                          Race                      Unknown

 

                          Ethnic Group              Unknown





Author





                          Author                    Jose CARLIN

 

                          Organization              [facility]

 

                          Address                   [address] [place]  [address]



 

                          Phone                     [phone]







Care Team Providers





                    Care Team Member Name Role                Phone

 

                    NISHA CARLIN     Unavailable         [phone]







PROBLEMS





          Type      Condition ICD9-CM Code ALQ69-ZG Code Onset Dates Condition S

tatus SNOMED 

Code

 

          Problem   High risk sexual behavior           Z72.51              Acti

ve    [id number]

 

          Problem   Tinea versicolor           B36.0               Active    564

34284

 

           Problem    Other specified diabetes mellitus without complications   

         E13.9                 Active

                                        [id number]

 

           Problem    Erectile dysfunction, unspecified erectile dysfunction typ

e            N52.9                 

Active                                  [id number]

 

          Problem   Hyperlipidemia, unspecified hyperlipidemia type           E7

8.5               Active    

[id number]







ALLERGIES





             Substance    Reaction     Event Type   Date         Status

 

             Latex        Unknown      Drug Allergy 15 Nov, 2017 Active

 

             Simvastatin  enlarged liver  Drug Allergy 15 Nov, 2017 Active







ENCOUNTERS





                Encounter       Location        Date            Diagnosis

 

                          [facility]     [address] [facility] [id number]

[address] [place] [phone]

                          07 Mar, 2018               

 

                          [facility]     [address] [facility] [id number]

[address] [place] [phone]

                                         

 

                          [facility]     [address] [id number]

[address] [place] [phone]

                                         

 

                          [facility]     [address] [id number]

[address] [place] [phone]

                                         

 

                          [facility]     [address] [id number]

[address] [place] [phone]

                          22 Dec, 2017               

 

                          [facility]     [address] [facility] [id number]

[address] [place] [phone]

                          07 Dec, 2017               

 

                          [facility]     [address] [facility] [id number]

[address] [place] [phone]

                                         

 

                          [facility]     [address] [id number]

[address] [place] [phone]

                          15 Nov, 2017              Tinea versicolor B36.0 ; Enc

ounter for immunization Z23 ; Other 

specified diabetes mellitus without complications E13.9 and High risk sexual 
behavior Z72.51

 

                          [facility]     [address] [id number]

[address] [place] [phone]

                          16 Oct, 2017               

 

                          [facility]     [address] [id number]

[address] [place] [phone]

                          22 Sep, 2017               

 

                          [facility]     [address] [id number]

[address] [place] [phone]

                          19 Sep, 2017               

 

                          [facility]     [address] [id number]

[address] [place] [phone]

                          19 Sep, 2017               

 

                          [facility]     [address] [id number]

[address] [place] [address]

                          08 Sep, 2017               

 

                          [facility]     [address] [id number]

[address] [place] [phone]

                          31 Aug, 2017               

 

                          [facility]     [address] [id number]

[address] [place] [phone]

                          23 Aug, 2017               

 

                          [facility]     [address] [id number]

[address] [place] [phone]

                          23 Aug, 2017              Tinea versicolor B36.0 ; Oth

er specified diabetes mellitus without 

complications E13.9 and Acute pain of right shoulder M25.511

 

                          [facility]     [address] [id number]

[address] [place] [phone]

                          17 Aug, 2017               

 

                          [facility]     [address] [id number]

[address] [place] [phone]

                          17 Aug, 2017               

 

                          [facility]     [address] [facility] [id number]

[address] [place] [phone]

                                         

 

                          [facility]     [address] [id number]

[address] [place] [phone]

                                         

 

                          [facility]     [address] [facility] [id number]

[address] [place] [phone]

                                         

 

                          [facility]     [address] [facility] [id number]

[address] [place] [phone]

                                         

 

                          [facility]     [address] [facility] [id number]

[address] [place] [phone]

                                         

 

                          [facility]     [address] [facility] [id number]

[address] [place] [phone]

                                        Other specified diabetes steven

litus without complications E13.9 ; 

Erectile dysfunction, unspecified erectile dysfunction type N52.9 ; 
Hyperlipidemia, unspecified hyperlipidemia type E78.5 ; Tinea versicolor B36.0 
and High risk sexual behavior Z72.51

 

                          [facility]     [address] [facility] [id number]

[address] [place] [phone]

                                         







IMMUNIZATIONS





                Vaccine         Route           Administration Date Status

 

                FLUARIX QUAD (3 AND UP)  IM Intramuscular Nov 15, 2017    Ad

ministered







SOCIAL HISTORY

Never Assessed



REASON FOR VISIT

Diabetes, States sugars have been all over the place.  They were in the 300-400 
range last month but now they are staying under 200. -MARCIA Davis



PLAN OF CARE





                          Activity                  Details

 

                                         

 

                          Follow Up                 6 Weeks Reason:DMT1







VITAL SIGNS





                    Height              62.5 in             2017-11-15

 

                    Weight              120 lbs             2017-11-15

 

                    Temperature         97.8 degrees Fahrenheit 2017-11-15

 

                    Heart Rate          100 bpm             2017-11-15

 

                    Respiratory Rate    18                  2017-11-15

 

                    BMI                 21.60 kg/m2         2017-11-15

 

                    Blood pressure systolic 122 mmHg            2017-11-15

 

                    Blood pressure diastolic 80 mmHg             2017-11-15







MEDICATIONS





        Medication Instructions Dosage  Frequency Start Date End Date Duration S

tatus Chesterujeo SoloStar 300 UNIT/ML Subcutaneous per sliding scale 45-50 units  

                               

Active

 

                NovoLog Flexpen 100 UNIT/ML Subcutaneous per sliding scale 10-60

 units per day  

                                                90 days         Active

 

        Probiotic -                                                 Active

 

          Terbinafine HCl 250 MG Orally Once a day 1 tablet  24h       , 2

017 13 Dec, 2017 

4 weeks                                 Active

 

        Multi Vitamin - Orally Once a day 1 tablet 24h                          

   Active

 

        Niacin 500 MG Orally Once a day 1 tablet with food 24h                  

           Active

 

        Viagra 100 MG Orally Once a day 1 tablet as needed 24h                  

           Active

 

        Meloxicam 10 MG Orally Once a day 1 capsule 24h                         

    Active

 

        Fish Oil 1000 MG Orally Once a day 1 capsule 24h                        

     Active

 

        Truvada 200-300 MG Orally Once a day 1 tablet 24h     29 2017      

           Active







RESULTS





                Name            Result          Date            Reference Range

 

                A1C (IN HOUSE)                  2017-11-15       

 

                A1C IN HOUSE    12.6                            4.3 - 5.6 %

 

                Previous A1c    14                               

 

                Lot             [id number]                             

 

                Exp date        2018                           







PROCEDURES





                Procedure       Date Ordered    Result          Body Site

 

                FLUARIX QUAD (3 AND UP) 2017 Nov 15, 2017                     

 

                SINGLE IMMUNIZATION ADMIN Nov 15, 2017                     

 

                GLYCATED HEMOGLOBIN TEST Nov 15, 2017                     







INSTRUCTIONS





MEDICATIONS ADMINISTERED

No Known Medications



MEDICAL (GENERAL) HISTORY





                    Type                Description         Date

 

                    Medical History     diabetes mellitus    

 

                    Medical History     hyperlipidemia       

 

                    Medical History     erectile dysfunction  

 

                    Medical History     anxiety attacks      

 

                    Medical History     depression           

 

                    Surgical History    tonsillectomy and adenoidectomy 

 

                    Surgical History    wisdom teeth extraction  

 

                    Surgical History    Lumpectomy on Tongue  

 

                    Surgical History    mole removal -back   

 

                    Hospitalization History Flu- Hospitalized at [facility] in 

[place], [place]

## 2019-12-26 NOTE — XMS REPORT
[facility]

                             Created on: 2018



Jose Flores

External Reference #: [id number]

: 1980

Sex: Female



Demographics





                          Address                   [address] [place]  [address]

 

                          Preferred Language        Unknown

 

                          Marital Status            Unknown

 

                          Hindu Affiliation     Unknown

 

                          Race                      Unknown

 

                          Ethnic Group              Unknown





Author





                          Author                    Jose CARLIN

 

                          Organization              [facility]

 

                          Address                   [address] [place]  [address]



 

                          Phone                     [phone]







Care Team Providers





                    Care Team Member Name Role                Phone

 

                    NISHA CARLIN     Unavailable         [phone]







PROBLEMS





          Type      Condition ICD9-CM Code APK05-PT Code Onset Dates Condition S

tatus SNOMED 

Code

 

          Problem   High risk sexual behavior           Z72.51              Acti

ve    [id number]

 

          Problem   Tinea versicolor           B36.0               Active    564

06701

 

           Problem    Other specified diabetes mellitus without complications   

         E13.9                 Active

                                        [id number]

 

           Problem    Erectile dysfunction, unspecified erectile dysfunction typ

e            N52.9                 

Active                                  [id number]

 

          Problem   Hyperlipidemia, unspecified hyperlipidemia type           E7

8.5               Active    

[id number]







ALLERGIES

No Information



ENCOUNTERS





                Encounter       Location        Date            Diagnosis

 

                          [facility]     [address] [id number]

[address] [place] [phone]

                          07 Mar, 2018               

 

                          [facility]     [address] [id number]

[address] [place] [phone]

                                         

 

                          [facility]     [address] [id number]

[address] [place] [phone]

                                         

 

                          [facility]     [address] [id number]

[address] [place] [phone]

                                         

 

                          [facility]     [address] [id number]

[address] [place] [phone]

                          22 Dec, 2017               

 

                          [facility]     [address] [id number]

[address] [place] [phone]

                          07 Dec, 2017               

 

                          [facility]     [address] [id number]

[address] [place] [address]

                                         

 

                          [facility]     [address] [id number]

[address] [place] [phone]

                          15 Nov, 2017              Tinea versicolor B36.0 ; Enc

ounter for immunization Z23 ; Other 

specified diabetes mellitus without complications E13.9 and High risk sexual 
behavior Z72.51

 

                          [facility]     [address] [id number]

[address] [place] [phone]

                          16 Oct, 2017               

 

                          [facility]     [address] [id number]

[address] [place] [phone]

                          22 Sep, 2017               

 

                          [facility]     [address] [id number]

[address] [place] [phone]

                          19 Sep, 2017               

 

                          [facility]     [address] [id number]

[address] [place] [phone]

                          19 Sep, 2017               

 

                          [facility]     [address] [id number]

[address] [place] [phone]

                          08 Sep, 2017               

 

                          [facility]     [address] [id number]

[address] [place] [phone]

                          31 Aug, 2017               

 

                          [facility]     [address] [id number]

[address] [place] [phone]

                          23 Aug, 2017               

 

                          [facility]     [address] [id number]

[address] [place] [phone]

                          23 Aug, 2017              Tinea versicolor B36.0 ; Oth

er specified diabetes mellitus without 

complications E13.9 and Acute pain of right shoulder M25.511

 

                          [facility]     [address] [id number]

[address] [place] [phone]

                          17 Aug, 2017               

 

                          [facility]     [address] [id number]

[address] [place] [phone]

                          17 Aug, 2017               

 

                          [facility]     [address] [id number]

[address] [place] [phone]

                                         

 

                          [facility]     [address] [id number]

[address] [place] [phone]

                                         

 

                          [facility]     [address] [id number]

[address] [place] [phone]

                                         

 

                          [facility]     [address] [id number]

[address] [place] [phone]

                                         

 

                          [facility]     [address] [facility] [id number]

[address] [place] [phone]

                                         

 

                          [facility]     [address] [facility] [id number]

[address] [place] [phone]

                                        Other specified diabetes steven

litus without complications E13.9 ; 

Erectile dysfunction, unspecified erectile dysfunction type N52.9 ; 
Hyperlipidemia, unspecified hyperlipidemia type E78.5 ; Tinea versicolor B36.0 
and High risk sexual behavior Z72.51

 

                          [facility]     [address] [facility] [id number]

[address] [place] [phone]

                                         







IMMUNIZATIONS

No Known Immunizations



SOCIAL HISTORY

Never Assessed



REASON FOR VISIT

Truvada rx 



PLAN OF CARE





VITAL SIGNS





MEDICATIONS

Unknown Medications



RESULTS

No Results



PROCEDURES

No Known procedures



INSTRUCTIONS





MEDICATIONS ADMINISTERED

No Known Medications



MEDICAL (GENERAL) HISTORY





                    Type                Description         Date

 

                    Medical History     diabetes mellitus    

 

                    Medical History     hyperlipidemia       

 

                    Medical History     erectile dysfunction  

 

                    Medical History     anxiety attacks      

 

                    Medical History     depression           

 

                    Surgical History    tonsillectomy and adenoidectomy 

 

                    Surgical History    wisdom teeth extraction  

 

                    Surgical History    Lumpectomy on Tongue  

 

                    Surgical History    mole removal -back   

 

                    Hospitalization History Flu- Hospitalized at [facility] in 

[place], [place]

## 2019-12-26 NOTE — XMS REPORT
[facility]

                             Created on: 2018



Jose Flores

External Reference #: [id number]

: 1980

Sex: Female



Demographics





                          Address                   [address] [place]  [address]

 

                          Preferred Language        Unknown

 

                          Marital Status            Unknown

 

                          Lutheran Affiliation     Unknown

 

                          Race                      Unknown

 

                          Ethnic Group              Unknown





Author





                          Author                    Jose CARLIN

 

                          Organization              [facility]

 

                          Address                   [address] [place]  [address]



 

                          Phone                     [phone]







Care Team Providers





                    Care Team Member Name Role                Phone

 

                    NISHA CARLIN     Unavailable         [phone]







PROBLEMS





          Type      Condition ICD9-CM Code PNM97-OG Code Onset Dates Condition S

tatus SNOMED 

Code

 

          Problem   High risk sexual behavior           Z72.51              Acti

ve    [id number]

 

          Problem   Tinea versicolor           B36.0               Active    564

90006

 

           Problem    Other specified diabetes mellitus without complications   

         E13.9                 Active

                                        [id number]

 

           Problem    Erectile dysfunction, unspecified erectile dysfunction typ

e            N52.9                 

Active                                  [id number]

 

          Problem   Hyperlipidemia, unspecified hyperlipidemia type           E7

8.5               Active    

[id number]







ALLERGIES

No Information



ENCOUNTERS





                Encounter       Location        Date            Diagnosis

 

                          [facility]     [address] [id number]

[address] [place] [phone]

                          07 Mar, 2018               

 

                          [facility]     [address] [id number]

[address] [place] [phone]

                                         

 

                          [facility]     [address] [id number]

[address] [place] [phone]

                                         

 

                          [facility]     [address] [id number]

[address] [place] [phone]

                                         

 

                          [facility]     [address] [id number]

[address] [place] [phone]

                          22 Dec, 2017               

 

                          [facility]     [address] [id number]

[address] [place] [phone]

                          07 Dec, 2017               

 

                          [facility]     [address] [id number]

[address] [place] [phone]

                                         

 

                          [facility]     [address] [id number]

[address] [place] [address]

                          15 Nov, 2017              Tinea versicolor B36.0 ; Enc

ounter for immunization Z23 ; Other 

specified diabetes mellitus without complications E13.9 and High risk sexual 
behavior Z72.51

 

                          [facility]     [address] [id number]

[address] [place] [phone]

                          16 Oct, 2017               

 

                          [facility]     [address] [id number]

[address] [place] [phone]

                          22 Sep, 2017               

 

                          [facility]     [address] [id number]

[address] [place] [phone]

                          19 Sep, 2017               

 

                          [facility]     [address] [id number]

[address] [place] [phone]

                          19 Sep, 2017               

 

                          [facility]     [address] [id number]

[address] [place] [phone]

                          08 Sep, 2017               

 

                          [facility]     [address] [id number]

[address] [place] [phone]

                          31 Aug, 2017               

 

                          [facility]     [address] [id number]

[address] [place] [phone]

                          23 Aug, 2017               

 

                          [facility]     [address] [id number]

[address] [place] [phone]

                          23 Aug, 2017              Tinea versicolor B36.0 ; Oth

er specified diabetes mellitus without 

complications E13.9 and Acute pain of right shoulder M25.511

 

                          [facility]     [address] [id number]

[address] [place] [phone]

                          17 Aug, 2017               

 

                          [facility]     [address] [id number]

[address] [place] [phone]

                          17 Aug, 2017               

 

                          [facility]     [address] [id number]

[address] [place] [phone]

                                         

 

                          [facility]     [address] [id number]

[address] [place] [phone]

                                         

 

                          [facility]     [address] [id number]

[address] [place] [phone]

                                         

 

                          [facility]     [address] [id number]

[address] [place] [phone]

                                         

 

                          [facility]     [address] [facility] [id number]

[address] [place] [phone]

                                         

 

                          [facility]     [address] [facility] [id number]

[address] [place] [phone]

                                        Other specified diabetes steven

litus without complications E13.9 ; 

Erectile dysfunction, unspecified erectile dysfunction type N52.9 ; 
Hyperlipidemia, unspecified hyperlipidemia type E78.5 ; Tinea versicolor B36.0 
and High risk sexual behavior Z72.51

 

                          [facility]     [address] [facility] [id number]

[address] [place] [phone]

                                         







IMMUNIZATIONS

No Known Immunizations



SOCIAL HISTORY

Never Assessed



REASON FOR VISIT

Pt cancel DM ed



PLAN OF CARE





VITAL SIGNS





MEDICATIONS

Unknown Medications



RESULTS

No Results



PROCEDURES

No Known procedures



INSTRUCTIONS





MEDICATIONS ADMINISTERED

No Known Medications



MEDICAL (GENERAL) HISTORY





                    Type                Description         Date

 

                    Medical History     diabetes mellitus    

 

                    Medical History     hyperlipidemia       

 

                    Medical History     erectile dysfunction  

 

                    Medical History     anxiety attacks      

 

                    Medical History     depression           

 

                    Surgical History    tonsillectomy and adenoidectomy 

 

                    Surgical History    wisdom teeth extraction  

 

                    Surgical History    Lumpectomy on Tongue  

 

                    Surgical History    mole removal -back   

 

                    Hospitalization History Flu- Hospitalized at [facility] in 

[place], [place]

## 2019-12-26 NOTE — XMS REPORT
[facility]

                             Created on: 04/10/2018



Jose Flores

External Reference #: [id number]

: 1980

Sex: Female



Demographics





                          Address                   [address] [place]  [address]

 

                          Preferred Language        Unknown

 

                          Marital Status            Unknown

 

                          Anglican Affiliation     Unknown

 

                          Race                      Unknown

 

                          Ethnic Group              Unknown





Author





                          Author                    Jose CARLIN

 

                          Organization              [facility]

 

                          Address                   [address] [place]  [address]



 

                          Phone                     [phone]







Care Team Providers





                    Care Team Member Name Role                Phone

 

                    NISHA CARLIN     Unavailable         [phone]







PROBLEMS





          Type      Condition ICD9-CM Code NDR81-EB Code Onset Dates Condition S

tatus SNOMED 

Code

 

          Problem   High risk sexual behavior           Z72.51              Acti

ve    [id number]

 

          Problem   Tinea versicolor           B36.0               Active    564

04824

 

           Problem    Other specified diabetes mellitus without complications   

         E13.9                 Active

                                        [id number]

 

           Problem    Erectile dysfunction, unspecified erectile dysfunction typ

e            N52.9                 

Active                                  [id number]

 

          Problem   Hyperlipidemia, unspecified hyperlipidemia type           E7

8.5               Active    

[id number]







ALLERGIES

No Information



ENCOUNTERS





                Encounter       Location        Date            Diagnosis

 

                          [facility]     [address] [id number]

[address] [place] [phone]

                          07 Mar, 2018               

 

                          [facility]     [address] [id number]

[address] [place] [phone]

                                         

 

                          [facility]     [address] [id number]

[address] [place] [phone]

                                         

 

                          [facility]     [id number]

[address] [place] [phone]

                                         

 

                          [facility]     [id number]

[address] [place] [phone]

                          22 Dec, 2017               

 

                          [facility]     [address] [id number]

[address] [place] [phone]

                          07 Dec, 2017               

 

                          [facility]     [address] [id number]

[address] [place] [phone]

                                         

 

                          [facility]     [address] [id number]

[address] [place] [phone]

                          15 Nov, 2017              Tinea versicolor B36.0 ; Enc

ounter for immunization Z23 ; Other 

specified diabetes mellitus without complications E13.9 and High risk sexual 
behavior Z72.51

 

                          [facility]     [address] [id number]

[address] [place] [phone]

                          16 Oct, 2017               

 

                          [facility]     [address] [id number]

[address] [place] [phone]

                          22 Sep, 2017               

 

                          [facility]     [address] [id number]

[address] [place] [phone]

                          19 Sep, 2017               

 

                          [facility]     [address] [id number]

[address] [place] [phone]

                          19 Sep, 2017               

 

                          [facility]     [address] [id number]

[address] [place] [phone]

                          08 Sep, 2017               

 

                          [facility]     [address] [id number]

[address] [place] [phone]

                          31 Aug, 2017               

 

                          [facility]     [address] [id number]

[address] [place] [phone]

                          23 Aug, 2017               

 

                          [facility]     [address] [id number]

[address] [place] [phone]

                          23 Aug, 2017              Tinea versicolor B36.0 ; Oth

er specified diabetes mellitus without 

complications E13.9 and Acute pain of right shoulder M25.511

 

                          [facility]     [address] [id number]

[address] [place] [phone]

                          17 Aug, 2017               

 

                          [facility]     [address] [id number]

[address] [place] [phone]

                          17 Aug, 2017               

 

                          [facility]     [address] [id number]

[address] [place] [phone]

                                         

 

                          [facility]     [address] [id number]

[address] [place] [phone]

                                         

 

                          [facility]     [address] [id number]

[address] [place] [phone]

                                         

 

                          [facility]     [address] [id number]

[address] [place] [phone]

                                         

 

                          [facility]     [address] [facility] [id number]

[address] [place] [phone]

                                         

 

                          [facility]     [address] [facility] [id number]

[address] [place] [phone]

                                        Other specified diabetes steven

litus without complications E13.9 ; 

Erectile dysfunction, unspecified erectile dysfunction type N52.9 ; 
Hyperlipidemia, unspecified hyperlipidemia type E78.5 ; Tinea versicolor B36.0 
and High risk sexual behavior Z72.51

 

                          [facility]     [address] [facility] [id number]

[address] [place] [phone]

                                         







IMMUNIZATIONS

No Known Immunizations



SOCIAL HISTORY

Never Assessed



REASON FOR VISIT

PALS-Shipment



PLAN OF CARE





VITAL SIGNS





MEDICATIONS

Unknown Medications



RESULTS

No Results



PROCEDURES

No Known procedures



INSTRUCTIONS





MEDICATIONS ADMINISTERED

No Known Medications



MEDICAL (GENERAL) HISTORY





                    Type                Description         Date

 

                    Medical History     diabetes mellitus    

 

                    Medical History     hyperlipidemia       

 

                    Medical History     erectile dysfunction  

 

                    Medical History     anxiety attacks      

 

                    Medical History     depression           

 

                    Surgical History    tonsillectomy and adenoidectomy 

 

                    Surgical History    wisdom teeth extraction  

 

                    Surgical History    Lumpectomy on Tongue  

 

                    Surgical History    mole removal -back   

 

                    Hospitalization History Flu- Hospitalized at [facility] in 

[place], [place]

## 2019-12-26 NOTE — XMS REPORT
[facility]

                             Created on: 2018



Jose Flores

External Reference #: [id number]

: 1980

Sex: Female



Demographics





                          Address                   [address] [place]  [address]

 

                          Preferred Language        Unknown

 

                          Marital Status            Unknown

 

                          Restoration Affiliation     Unknown

 

                          Race                      Unknown

 

                          Ethnic Group              Unknown





Author





                          Author                    Jose CARLIN

 

                          Organization              [facility]

 

                          Address                   [address] [place]  [address]



 

                          Phone                     [phone]







Care Team Providers





                    Care Team Member Name Role                Phone

 

                    NISHA CARLIN     Unavailable         [phone]







PROBLEMS





          Type      Condition ICD9-CM Code HHA84-EL Code Onset Dates Condition S

tatus SNOMED 

Code

 

          Problem   High risk sexual behavior           Z72.51              Acti

ve    [id number]

 

          Problem   Tinea versicolor           B36.0               Active    564

44087

 

           Problem    Other specified diabetes mellitus without complications   

         E13.9                 Active

                                        [id number]

 

           Problem    Erectile dysfunction, unspecified erectile dysfunction typ

e            N52.9                 

Active                                  [id number]

 

          Problem   Hyperlipidemia, unspecified hyperlipidemia type           E7

8.5               Active    

[id number]







ALLERGIES

No Information



ENCOUNTERS





                Encounter       Location        Date            Diagnosis

 

                          [facility]     [address] [id number]

[address] [place] [phone]

                          07 Mar, 2018               

 

                          [facility]     [address] [id number]

[address] [place] [address]

                                         

 

                          [facility]     [address] [id number]

[address] [place] [address]

                                         

 

                          [facility]     [id number]

[address] [place] [address]

                                         

 

                          [facility]     [id number]

[address] [place] [address]

                          22 Dec, 2017               

 

                          [facility]     [address] [id number]

[address] [place] [address]

                          07 Dec, 2017               

 

                          [facility]     [address] [id number]

[address] [place] [phone]

                                         

 

                          [facility]     [address] [id number]

[address] [place] [phone]

                          15 Nov, 2017              Tinea versicolor B36.0 ; Enc

ounter for immunization Z23 ; Other 

specified diabetes mellitus without complications E13.9 and High risk sexual 
behavior Z72.51

 

                          [facility]     [address] [id number]

[address] [place] [address]

                          16 Oct, 2017               

 

                          [facility]     [address] [id number]

[address] [place] [phone]

                          22 Sep, 2017               

 

                          [facility]     [address] [id number]

[address] [place] [address]

                          19 Sep, 2017               

 

                          [facility]     [address] [id number]

[address] [place] [address]

                          19 Sep, 2017               

 

                          [facility]     [address] [id number]

[address] [place] [address]

                          08 Sep, 2017               

 

                          [facility]     [address] [id number]

[address] [place] [address]

                          31 Aug, 2017               

 

                          [facility]     [address] [id number]

[address] [place] [address]

                          23 Aug, 2017               

 

                          [facility]     [address] [id number]

[address] [place] [address]

                          23 Aug, 2017              Tinea versicolor B36.0 ; Oth

er specified diabetes mellitus without 

complications E13.9 and Acute pain of right shoulder M25.511

 

                          [facility]     [address] [id number]

[address] [place] [phone]

                          17 Aug, 2017               

 

                          [facility]     [address] [id number]

[address] [place] [address]

                          17 Aug, 2017               

 

                          [facility]     [address] [id number]

[address] [place] [address]

                                         

 

                          [facility]     [address] [id number]

[address] [place] [address]

                                         

 

                          [facility]     [address] [id number]

[address] [place] [address]

                                         

 

                          [facility]     [address] [id number]

[address] [place] [phone]

                                         

 

                          [facility]     [address] [facility] [id number]

[address] [place] [phone]

                                         

 

                          [facility]     [address] [facility] [id number]

[address] [place] [address]

                                        Other specified diabetes steven

litus without complications E13.9 ; 

Erectile dysfunction, unspecified erectile dysfunction type N52.9 ; 
Hyperlipidemia, unspecified hyperlipidemia type E78.5 ; Tinea versicolor B36.0 
and High risk sexual behavior Z72.51

 

                          [facility]     [address] [facility] [id number]

[address] [place] [phone]

                                         







IMMUNIZATIONS

No Known Immunizations



SOCIAL HISTORY

Never Assessed



REASON FOR VISIT

[facility]



PLAN OF CARE





VITAL SIGNS





MEDICATIONS

Unknown Medications



RESULTS

No Results



PROCEDURES

No Known procedures



INSTRUCTIONS





MEDICATIONS ADMINISTERED

No Known Medications



MEDICAL (GENERAL) HISTORY





                    Type                Description         Date

 

                    Medical History     diabetes mellitus    

 

                    Medical History     hyperlipidemia       

 

                    Medical History     erectile dysfunction  

 

                    Medical History     anxiety attacks      

 

                    Medical History     depression           

 

                    Surgical History    tonsillectomy and adenoidectomy 

 

                    Surgical History    wisdom teeth extraction  

 

                    Surgical History    Lumpectomy on Tongue  

 

                    Surgical History    mole removal -back   

 

                    Hospitalization History Flu- Hospitalized at [facility] in 

[place], [place]

## 2019-12-26 NOTE — XMS REPORT
[facility]

                             Created on: 2018



Jose Flores

External Reference #: [id number]

: 1980

Sex: Female



Demographics





                          Address                   [address] [place]  [address]

 

                          Preferred Language        Unknown

 

                          Marital Status            Unknown

 

                          Druze Affiliation     Unknown

 

                          Race                      Unknown

 

                          Ethnic Group              Unknown





Author





                          Author                    Jose CARLIN

 

                          Organization              [facility]

 

                          Address                   [address] [place]  [address]



 

                          Phone                     [phone]







Care Team Providers





                    Care Team Member Name Role                Phone

 

                    NISHA CARLIN     Unavailable         [phone]







PROBLEMS





          Type      Condition ICD9-CM Code WFJ39-UW Code Onset Dates Condition S

tatus SNOMED 

Code

 

          Problem   High risk sexual behavior           Z72.51              Acti

ve    [id number]

 

          Problem   Tinea versicolor           B36.0               Active    564

53628

 

           Problem    Other specified diabetes mellitus without complications   

         E13.9                 Active

                                        [id number]

 

           Problem    Erectile dysfunction, unspecified erectile dysfunction typ

e            N52.9                 

Active                                  [id number]

 

          Problem   Hyperlipidemia, unspecified hyperlipidemia type           E7

8.5               Active    

[id number]







ALLERGIES

No Information



ENCOUNTERS





                Encounter       Location        Date            Diagnosis

 

                          [facility]     [address] [id number]

[address] [place] [phone]

                          07 Mar, 2018               

 

                          [facility]     [address] [id number]

[address] [place] [phone]

                                         

 

                          [facility]     [address] [id number]

[address] [place] [phone]

                                         

 

                          [facility]     [address]

[address] [place] [phone]

                                         

 

                          [facility]     [address] [id number]

[address] [place] [phone]

                          22 Dec, 2017               

 

                          [facility]     [address] [id number]

[address] [place] [phone]

                          07 Dec, 2017               

 

                          [facility]     [address] [id number]

[address] [place] [phone]

                                         

 

                          [facility]     [address] [id number]

[address] [place] [address]

                          15 Nov, 2017              Tinea versicolor B36.0 ; Enc

ounter for immunization Z23 ; Other 

specified diabetes mellitus without complications E13.9 and High risk sexual 
behavior Z72.51

 

                          [facility]     [address] [id number]

[address] [place] [phone]

                          16 Oct, 2017               

 

                          [facility]     [address] [id number]

[address] [place] [phone]

                          22 Sep, 2017               

 

                          [facility]     [address] [id number]

[address] [place] [phone]

                          19 Sep, 2017               

 

                          [facility]     [address] [id number]

[address] [place] [phone]

                          19 Sep, 2017               

 

                          [facility]     [address] [id number]

[address] [place] [phone]

                          08 Sep, 2017               

 

                          [facility]     [address] [id number]

[address] [place] [phone]

                          31 Aug, 2017               

 

                          [facility]     [address] [id number]

[address] [place] [phone]

                          23 Aug, 2017               

 

                          [facility]     [address] [id number]

[address] [place] [phone]

                          23 Aug, 2017              Tinea versicolor B36.0 ; Oth

er specified diabetes mellitus without 

complications E13.9 and Acute pain of right shoulder M25.511

 

                          [facility]     [address] [id number]

[address] [place] [phone]

                          17 Aug, 2017               

 

                          [facility]     [address] [id number]

[address] [place] [address]

                          17 Aug, 2017               

 

                          [facility]     [address] [id number]

[address] [place] [phone]

                                         

 

                          [facility]     [address] [id number]

[address] [place] [phone]

                                         

 

                          [facility]     [address] [id number]

[address] [place] [phone]

                                         

 

                          [facility]     [address] [id number]

[address] [place] [phone]

                                         

 

                          [facility]     [address] [facility] [id number]

[address] [place] [phone]

                                         

 

                          [facility]     [address] [facility] [id number]

[address] [place] [phone]

                                        Other specified diabetes steven

litus without complications E13.9 ; 

Erectile dysfunction, unspecified erectile dysfunction type N52.9 ; 
Hyperlipidemia, unspecified hyperlipidemia type E78.5 ; Tinea versicolor B36.0 
and High risk sexual behavior Z72.51

 

                          [facility]     [address] [facility] [id number]

[address] [place] [phone]

                                         







IMMUNIZATIONS

No Known Immunizations



SOCIAL HISTORY

Never Assessed



REASON FOR VISIT

[facility]



PLAN OF CARE





VITAL SIGNS





MEDICATIONS

Unknown Medications



RESULTS

No Results



PROCEDURES

No Known procedures



INSTRUCTIONS





MEDICATIONS ADMINISTERED

No Known Medications



MEDICAL (GENERAL) HISTORY





                    Type                Description         Date

 

                    Medical History     diabetes mellitus    

 

                    Medical History     hyperlipidemia       

 

                    Medical History     erectile dysfunction  

 

                    Medical History     anxiety attacks      

 

                    Medical History     depression           

 

                    Surgical History    tonsillectomy and adenoidectomy 

 

                    Surgical History    wisdom teeth extraction  

 

                    Surgical History    Lumpectomy on Tongue  

 

                    Surgical History    mole removal -back   

 

                    Hospitalization History Flu- Hospitalized at [facility] in 

[place], [place]

## 2019-12-26 NOTE — XMS REPORT
[facility]

                             Created on: 2018



Jose Flores

External Reference #: [id number]

: 1980

Sex: Female



Demographics





                          Address                   [address] [place]  [address]

 

                          Preferred Language        Unknown

 

                          Marital Status            Unknown

 

                          Christianity Affiliation     Unknown

 

                          Race                      Unknown

 

                          Ethnic Group              Unknown





Author





                          Author                    Jose CARLIN

 

                          Organization              [facility]

 

                          Address                   [address] [place]  [address]



 

                          Phone                     [phone]







Care Team Providers





                    Care Team Member Name Role                Phone

 

                    NISHA CARLIN     Unavailable         [phone]







PROBLEMS





          Type      Condition ICD9-CM Code KLI05-NZ Code Onset Dates Condition S

tatus SNOMED 

Code

 

          Problem   High risk sexual behavior           Z72.51              Acti

ve    [id number]

 

          Problem   Tinea versicolor           B36.0               Active    564

98312

 

           Problem    Other specified diabetes mellitus without complications   

         E13.9                 Active

                                        [id number]

 

           Problem    Erectile dysfunction, unspecified erectile dysfunction typ

e            N52.9                 

Active                                  [id number]

 

          Problem   Hyperlipidemia, unspecified hyperlipidemia type           E7

8.5               Active    

[id number]







ALLERGIES

No Information



ENCOUNTERS





                Encounter       Location        Date            Diagnosis

 

                          [facility]     [address] [id number]

[address] [place] [phone]

                          07 Mar, 2018               

 

                          [facility]     [address] [id number]

[address] [place] [phone]

                                         

 

                          [facility]     [address] [id number]

[address] [place] [phone]

                                         

 

                          [facility]     [id number]

[address] [place] [phone]

                                         

 

                          [facility]     [id number]

[address] [place] [phone]

                          22 Dec, 2017               

 

                          [facility]     [address] [id number]

[address] [place] [phone]

                          07 Dec, 2017               

 

                          [facility]     [address] [id number]

[address] [place] [phone]

                                         

 

                          [facility]     [address] [id number]

[address] [place] [phone]

                          15 Nov, 2017              Tinea versicolor B36.0 ; Enc

ounter for immunization Z23 ; Other 

specified diabetes mellitus without complications E13.9 and High risk sexual 
behavior Z72.51

 

                          [facility]     [address] [id number]

[address] [place] [phone]

                          16 Oct, 2017               

 

                          [facility]     [address] [id number]

[address] [place] [phone]

                          22 Sep, 2017               

 

                          [facility]     [address] [id number]

[address] [place] [phone]

                          19 Sep, 2017               

 

                          [facility]     [address] [id number]

[address] [place] [phone]

                          19 Sep, 2017               

 

                          [facility]     [address] [id number]

[address] [place] [phone]

                          08 Sep, 2017               

 

                          [facility]     [address] [id number]

[address] [place] [phone]

                          31 Aug, 2017               

 

                          [facility]     [address] [id number]

[address] [place] [phone]

                          23 Aug, 2017               

 

                          [facility]     [address] [id number]

[address] [place] [phone]

                          23 Aug, 2017              Tinea versicolor B36.0 ; Oth

er specified diabetes mellitus without 

complications E13.9 and Acute pain of right shoulder M25.511

 

                          [facility]     [address] [id number]

[address] [place] [phone]

                          17 Aug, 2017               

 

                          [facility]     [address] [id number]

[address] [place] [phone]

                          17 Aug, 2017               

 

                          [facility]     [address] [id number]

[address] [place] [phone]

                                         

 

                          [facility]     [address] [id number]

[address] [place] [phone]

                                         

 

                          [facility]     [address] [id number]

[address] [place] [phone]

                                         

 

                          [facility]     [address] [id number]

[address] [place] [phone]

                                         

 

                          [facility]     [address] [facility] [id number]

[address] [place] [phone]

                                         

 

                          [facility]     [address] [facility] [id number]

[address] [place] [phone]

                                        Other specified diabetes steven

litus without complications E13.9 ; 

Erectile dysfunction, unspecified erectile dysfunction type N52.9 ; 
Hyperlipidemia, unspecified hyperlipidemia type E78.5 ; Tinea versicolor B36.0 
and High risk sexual behavior Z72.51

 

                          [facility]     [address] [facility] [id number]

[address] [place] [phone]

                                         







IMMUNIZATIONS

No Known Immunizations



SOCIAL HISTORY

Never Assessed



REASON FOR VISIT

PALS IN-[place]



PLAN OF CARE





VITAL SIGNS





MEDICATIONS

Unknown Medications



RESULTS

No Results



PROCEDURES

No Known procedures



INSTRUCTIONS





MEDICATIONS ADMINISTERED

No Known Medications



MEDICAL (GENERAL) HISTORY





                    Type                Description         Date

 

                    Medical History     diabetes mellitus    

 

                    Medical History     hyperlipidemia       

 

                    Medical History     erectile dysfunction  

 

                    Medical History     anxiety attacks      

 

                    Medical History     depression           

 

                    Surgical History    tonsillectomy and adenoidectomy 

 

                    Surgical History    wisdom teeth extraction  

 

                    Surgical History    Lumpectomy on Tongue  

 

                    Surgical History    mole removal -back   

 

                    Hospitalization History Flu- Hospitalized at [facility] in 

[place], [place]

## 2019-12-26 NOTE — XMS REPORT
[facility]

                             Created on: 2018



Jose Flores

External Reference #: [id number]

: 1980

Sex: Female



Demographics





                          Address                   [address] [place]  [address]

 

                          Preferred Language        Unknown

 

                          Marital Status            Unknown

 

                          Denominational Affiliation     Unknown

 

                          Race                      Unknown

 

                          Ethnic Group              Unknown





Author





                          Author                    Jose CARLIN

 

                          Organization              [facility]

 

                          Address                   [address] [place]  [address]



 

                          Phone                     [phone]







Care Team Providers





                    Care Team Member Name Role                Phone

 

                    NISHA CARLIN     Unavailable         [phone]







PROBLEMS





          Type      Condition ICD9-CM Code FWQ20-RU Code Onset Dates Condition S

tatus SNOMED 

Code

 

          Problem   High risk sexual behavior           Z72.51              Acti

ve    [id number]

 

          Problem   Tinea versicolor           B36.0               Active    564

29157

 

           Problem    Other specified diabetes mellitus without complications   

         E13.9                 Active

                                        [id number]

 

           Problem    Erectile dysfunction, unspecified erectile dysfunction typ

e            N52.9                 

Active                                  [id number]

 

          Problem   Hyperlipidemia, unspecified hyperlipidemia type           E7

8.5               Active    

[id number]







ALLERGIES





             Substance    Reaction     Event Type   Date         Status

 

             Latex        Unknown      Drug Allergy  Active

 

             Simvastatin  enlarged liver  Drug Allergy  Active







ENCOUNTERS





                Encounter       Location        Date            Diagnosis

 

                          [facility]     [address] [id number]

[address] [place] [phone]

                          07 Mar, 2018               

 

                          [facility]     [address] [facility] [id number]

[address] [place] [phone]

                                         

 

                          [facility]     [address] [id number]

[address] [place] [phone]

                                         

 

                          [facility]     [address] [id number]

[address] [place] [phone]

                                         

 

                          [facility]     [address] [id number]

[address] [place] [phone]

                          22 Dec, 2017               

 

                          [facility]     [address] [facility] [id number]

[address] [place] [phone]

                          07 Dec, 2017               

 

                          [facility]     [address] [facility] [id number]

[address] [place] [phone]

                                         

 

                          [facility]     [address] [id number]

[address] [place] [phone]

                          15 Nov, 2017              Tinea versicolor B36.0 ; Enc

ounter for immunization Z23 ; Other 

specified diabetes mellitus without complications E13.9 and High risk sexual 
behavior Z72.51

 

                          [facility]     [address] [id number]

[address] [place] [phone]

                          16 Oct, 2017               

 

                          [facility]     [address] [id number]

[address] [place] [phone]

                          22 Sep, 2017               

 

                          [facility]     [address] [id number]

[address] [place] [phone]

                          19 Sep, 2017               

 

                          [facility]     [address] [id number]

[address] [place] [phone]

                          19 Sep, 2017               

 

                          [facility]     [address] [id number]

[address] [place] [phone]

                          08 Sep, 2017               

 

                          [facility]     [address] [id number]

[address] [place] [phone]

                          31 Aug, 2017               

 

                          [facility]     [address] [id number]

[address] [place] [phone]

                          23 Aug, 2017               

 

                          [facility]     [address] [id number]

[address] [place] [phone]

                          23 Aug, 2017              Tinea versicolor B36.0 ; Oth

er specified diabetes mellitus without 

complications E13.9 and Acute pain of right shoulder M25.511

 

                          [facility]     [address] [id number]

[address] [place] [phone]

                          17 Aug, 2017               

 

                          [facility]     [address] [id number]

[address] [place] [phone]

                          17 Aug, 2017               

 

                          [facility]     [address] [facility] [id number]

[address] [place] [phone]

                                         

 

                          [facility]     [address] [id number]

[address] [place] [phone]

                                         

 

                          [facility]     [address] [facility] [id number]

[address] [place] [phone]

                                         

 

                          [facility]     [address] [facility] [id number]

[address] [place] [phone]

                                         

 

                          [facility]     [address] [facility] [id number]

[address] [place] [phone]

                                         

 

                          [facility]     [address] [facility] [id number]

[address] [place] [phone]

                                        Other specified diabetes steven

litus without complications E13.9 ; 

Erectile dysfunction, unspecified erectile dysfunction type N52.9 ; 
Hyperlipidemia, unspecified hyperlipidemia type E78.5 ; Tinea versicolor B36.0 
and High risk sexual behavior Z72.51

 

                          [facility]     [address] [facility] [id number]

[address] [place] [phone]

                                         







IMMUNIZATIONS

No Known Immunizations



SOCIAL HISTORY

Never Assessed



REASON FOR VISIT

Establish Care- Has forms he is needing filled out for assistance for his DM Med
ications  SFondren



PLAN OF CARE





                          Activity                  Details

 

                                         

 

                          Follow Up                 6 Weeks Reason:lab FU







VITAL SIGNS





                    Height              62.5 in             2017

 

                    Weight              124.2 lbs           2017

 

                    Temperature         98.0 degrees Fahrenheit 2017

 

                    Heart Rate          80 bpm              2017

 

                    Respiratory Rate    18                  2017

 

                    BMI                 22.35 kg/m2         2017

 

                    Blood pressure systolic 122 mmHg            2017

 

                    Blood pressure diastolic 88 mmHg             2017







MEDICATIONS





        Medication Instructions Dosage  Frequency Start Date End Date Duration S

tatus

 

        Multi Vitamin - Orally Once a day 1 tablet 24h                          

   Active

 

        Meloxicam 10 MG Orally Once a day 1 capsule 24h                         

    Active

 

        Truvada 200-300 MG Orally Once a day 1 tablet 24h           

   90 days Active

 

        Viagra 100 MG Orally Once a day 1 tablet as needed 24h                  

           Active

 

          Terbinafine HCl 250 MG Orally Once a day 1 tablet  24h       , 

017  

30 days                                 Active

 

        Tizanidine HCl 4 MG Orally Three times a day 1 capsule as needed 8h     

                         Active

 

          Toujeo SoloStar 300 UNIT/ML Subcutaneous sliding scale per blood sugar

                                          

                                        Active

 

        Probiotic -                                                 Active

 

        NovoLog Flexpen 100 UNIT/ML Subcutaneous per sliding scale              

                           Active

 

        Fish Oil 1000 MG Orally Once a day 1 capsule 24h                        

     Active

 

        Niacin 500 MG Orally Once a day 1 tablet with food 24h                  

           Active







RESULTS





                Name            Result          Date            Reference Range

 

                MICROALBUMIN, URINE (IN HOUSE)                 2017       

 

                MICROALBUMIN                                     

 

                Lot #           [id number]                           

 

                Exp date        2018                       

 

                Clarity         Clear                            

 

                Color           Yellow                           

 

                ALB             150 mg/L                         

 

                CRE             200 mg/dL                        

 

                A:C (IN HOUSE)   mg/g*                     

 

                Control                                          

 

                Control                                          

 

                Lot #                                            

 

                Exp date                                         

 

                A1C (IN HOUSE)                  2017       

 

                A1C IN HOUSE    14.0%                           4.3 - 5.6 %

 

                Previous A1c    N/A                              

 

                Lot             [id number]                             

 

                Exp date        2019                          







PROCEDURES





                Procedure       Date Ordered    Result          Body Site

 

                MICROALBUMIN, SEMIQUANT 2017                    

 

                GLYCATED HEMOGLOBIN TEST 2017                    







INSTRUCTIONS





MEDICATIONS ADMINISTERED

No Known Medications



MEDICAL (GENERAL) HISTORY





                    Type                Description         Date

 

                    Medical History     diabetes mellitus    

 

                    Medical History     hyperlipidemia       

 

                    Medical History     erectile dysfunction  

 

                    Medical History     anxiety attacks      

 

                    Medical History     depression           

 

                    Surgical History    tonsillectomy and adenoidectomy 

 

                    Surgical History    wisdom teeth extraction  

 

                    Surgical History    Lumpectomy on Tongue  

 

                    Surgical History    mole removal -back   

 

                    Hospitalization History Flu- Hospitalized at [facility] in 

[place], [place]

## 2019-12-26 NOTE — XMS REPORT
[facility]

                             Created on: 2018



Jose Flores

External Reference #: [id number]

: 1980

Sex: Female



Demographics





                          Address                   [address] [place]  [address]

 

                          Preferred Language        Unknown

 

                          Marital Status            Unknown

 

                          Hinduism Affiliation     Unknown

 

                          Race                      Unknown

 

                          Ethnic Group              Unknown





Author





                          Author                    Jose CARLIN

 

                          Organization              [facility]

 

                          Address                   [address] [place]  [address]



 

                          Phone                     [phone]







Care Team Providers





                    Care Team Member Name Role                Phone

 

                    NISHA CARLIN     Unavailable         [phone]







PROBLEMS





          Type      Condition ICD9-CM Code YNE96-KD Code Onset Dates Condition S

tatus SNOMED 

Code

 

          Problem   High risk sexual behavior           Z72.51              Acti

ve    [id number]

 

          Problem   Tinea versicolor           B36.0               Active    564

27095

 

           Problem    Other specified diabetes mellitus without complications   

         E13.9                 Active

                                        [id number]

 

           Problem    Erectile dysfunction, unspecified erectile dysfunction typ

e            N52.9                 

Active                                  [id number]

 

          Problem   Hyperlipidemia, unspecified hyperlipidemia type           E7

8.5               Active    

[id number]







ALLERGIES

No Information



ENCOUNTERS





                Encounter       Location        Date            Diagnosis

 

                          [facility]     [address] [id number]

[address] [place] [phone]

                          07 Mar, 2018               

 

                          [facility]     [address] [id number]

[address] [place] [phone]

                                         

 

                          [facility]     [address] [id number]

[address] [place] [phone]

                                         

 

                          [facility]     [id number]

[address] [place] [phone]

                                         

 

                          [facility]     [address] [id number]

[address] [place] [phone]

                          22 Dec, 2017               

 

                          [facility]     [address] [id number]

[address] [place] [phone]

                          07 Dec, 2017               

 

                          [facility]     [address] [id number]

[address] [place] [address]

                                         

 

                          [facility]     [address] [id number]

[address] [place] [phone]

                          15 Nov, 2017              Tinea versicolor B36.0 ; Enc

ounter for immunization Z23 ; Other 

specified diabetes mellitus without complications E13.9 and High risk sexual 
behavior Z72.51

 

                          [facility]     [address] [id number]

[address] [place] [phone]

                          16 Oct, 2017               

 

                          [facility]     [address] [id number]

[address] [place] [phone]

                          22 Sep, 2017               

 

                          [facility]     [address] [id number]

[address] [place] [phone]

                          19 Sep, 2017               

 

                          [facility]     [address] [id number]

[address] [place] [phone]

                          19 Sep, 2017               

 

                          [facility]     [address] [id number]

[address] [place] [phone]

                          08 Sep, 2017               

 

                          [facility]     [address] [id number]

[address] [place] [phone]

                          31 Aug, 2017               

 

                          [facility]     [address] [id number]

[address] [place] [phone]

                          23 Aug, 2017               

 

                          [facility]     [address] [id number]

[address] [place] [phone]

                          23 Aug, 2017              Tinea versicolor B36.0 ; Oth

er specified diabetes mellitus without 

complications E13.9 and Acute pain of right shoulder M25.511

 

                          [facility]     [address] [id number]

[address] [place] [phone]

                          17 Aug, 2017               

 

                          [facility]     [address] [id number]

[address] [place] [phone]

                          17 Aug, 2017               

 

                          [facility]     [address] [id number]

[address] [place] [phone]

                                         

 

                          [facility]     [address] [id number]

[address] [place] [phone]

                                         

 

                          [facility]     [address] [id number]

[address] [place] [phone]

                                         

 

                          [facility]     [address] [id number]

[address] [place] [phone]

                                         

 

                          [facility]     [address] [facility] [id number]

[address] [place] [phone]

                                         

 

                          [facility]     [address] [facility] [id number]

[address] [place] [phone]

                                        Other specified diabetes steven

litus without complications E13.9 ; 

Erectile dysfunction, unspecified erectile dysfunction type N52.9 ; 
Hyperlipidemia, unspecified hyperlipidemia type E78.5 ; Tinea versicolor B36.0 
and High risk sexual behavior Z72.51

 

                          [facility]     [address] [facility] [id number]

[address] [place] [phone]

                                         







IMMUNIZATIONS

No Known Immunizations



SOCIAL HISTORY

Never Assessed



REASON FOR VISIT

[employer] Delivery date



PLAN OF CARE





VITAL SIGNS





MEDICATIONS

Unknown Medications



RESULTS

No Results



PROCEDURES

No Known procedures



INSTRUCTIONS





MEDICATIONS ADMINISTERED

No Known Medications



MEDICAL (GENERAL) HISTORY





                    Type                Description         Date

 

                    Medical History     diabetes mellitus    

 

                    Medical History     hyperlipidemia       

 

                    Medical History     erectile dysfunction  

 

                    Medical History     anxiety attacks      

 

                    Medical History     depression           

 

                    Surgical History    tonsillectomy and adenoidectomy 

 

                    Surgical History    wisdom teeth extraction  

 

                    Surgical History    Lumpectomy on Tongue  

 

                    Surgical History    mole removal -back   

 

                    Hospitalization History Flu- Hospitalized at [facility] in 

[place], [place]

## 2019-12-26 NOTE — XMS REPORT
[facility]

                             Created on: 2018



Jose Flores

External Reference #: [id number]

: 1980

Sex: Female



Demographics





                          Address                   [address] [place]  [address]

 

                          Preferred Language        Unknown

 

                          Marital Status            Unknown

 

                          Mandaen Affiliation     Unknown

 

                          Race                      Unknown

 

                          Ethnic Group              Unknown





Author





                          Author                    Jose CARLIN

 

                          Organization              [facility]

 

                          Address                   [address] [place]  [address]



 

                          Phone                     [phone]







Care Team Providers





                    Care Team Member Name Role                Phone

 

                    NISHA CARLIN     Unavailable         [phone]







PROBLEMS





          Type      Condition ICD9-CM Code QPC31-NQ Code Onset Dates Condition S

tatus SNOMED 

Code

 

          Problem   High risk sexual behavior           Z72.51              Acti

ve    [id number]

 

          Problem   Tinea versicolor           B36.0               Active    564

64082

 

           Problem    Other specified diabetes mellitus without complications   

         E13.9                 Active

                                        [id number]

 

           Problem    Erectile dysfunction, unspecified erectile dysfunction typ

e            N52.9                 

Active                                  [id number]

 

          Problem   Hyperlipidemia, unspecified hyperlipidemia type           E7

8.5               Active    

[id number]







ALLERGIES

No Information



ENCOUNTERS





                Encounter       Location        Date            Diagnosis

 

                          [facility]     [address] [id number]

[address] [place] [phone]

                          07 Mar, 2018               

 

                          [facility]     [address] [id number]

[address] [place] [phone]

                                         

 

                          [facility]     [address] [id number]

[address] [place] [phone]

                                         

 

                          [facility]     [id number]

[address] [place] [phone]

                                         

 

                          [facility]     [address]

[address] [place] [phone]

                          22 Dec, 2017               

 

                          [facility]     [address] [id number]

[address] [place] [address]

                          07 Dec, 2017               

 

                          [facility]     [address] [id number]

[address] [place] [phone]

                                         

 

                          [facility]     [address] [id number]

[address] [place] [phone]

                          15 Nov, 2017              Tinea versicolor B36.0 ; Enc

ounter for immunization Z23 ; Other 

specified diabetes mellitus without complications E13.9 and High risk sexual 
behavior Z72.51

 

                          [facility]     [address] [id number]

[address] [place] [address]

                          16 Oct, 2017               

 

                          [facility]     [address] [id number]

[address] [place] [phone]

                          22 Sep, 2017               

 

                          [facility]     [address] [id number]

[address] [place] [phone]

                          19 Sep, 2017               

 

                          [facility]     [address] [id number]

[address] [place] [phone]

                          19 Sep, 2017               

 

                          [facility]     [address] [id number]

[address] [place] [phone]

                          08 Sep, 2017               

 

                          [facility]     [address] [id number]

[address] [place] [phone]

                          31 Aug, 2017               

 

                          [facility]     [address] [id number]

[address] [place] [phone]

                          23 Aug, 2017               

 

                          [facility]     [address] [id number]

[address] [place] [phone]

                          23 Aug, 2017              Tinea versicolor B36.0 ; Oth

er specified diabetes mellitus without 

complications E13.9 and Acute pain of right shoulder M25.511

 

                          [facility]     [address] [id number]

[address] [place] [address]

                          17 Aug, 2017               

 

                          [facility]     [address] [id number]

[address] [place] [phone]

                          17 Aug, 2017               

 

                          [facility]     [address] [id number]

[address] [place] [address]

                                         

 

                          [facility]     [address] [id number]

[address] [place] [phone]

                                         

 

                          [facility]     [address] [id number]

[address] [place] [phone]

                                         

 

                          [facility]     [address] [id number]

[address] [place] [phone]

                                         

 

                          [facility]     [address] [facility] [id number]

[address] [place] [phone]

                                         

 

                          [facility]     [address] [facility] [id number]

[address] [place] [phone]

                                        Other specified diabetes steven

litus without complications E13.9 ; 

Erectile dysfunction, unspecified erectile dysfunction type N52.9 ; 
Hyperlipidemia, unspecified hyperlipidemia type E78.5 ; Tinea versicolor B36.0 
and High risk sexual behavior Z72.51

 

                          [facility]     [address] [facility] [id number]

[address] [place] [address]

                                         







IMMUNIZATIONS

No Known Immunizations



SOCIAL HISTORY

Never Assessed



REASON FOR VISIT

DM ed scheduled



PLAN OF CARE





VITAL SIGNS





MEDICATIONS

Unknown Medications



RESULTS

No Results



PROCEDURES

No Known procedures



INSTRUCTIONS





MEDICATIONS ADMINISTERED

No Known Medications



MEDICAL (GENERAL) HISTORY





                    Type                Description         Date

 

                    Medical History     diabetes mellitus    

 

                    Medical History     hyperlipidemia       

 

                    Medical History     erectile dysfunction  

 

                    Medical History     anxiety attacks      

 

                    Medical History     depression           

 

                    Surgical History    tonsillectomy and adenoidectomy 

 

                    Surgical History    wisdom teeth extraction  

 

                    Surgical History    Lumpectomy on Tongue  

 

                    Surgical History    mole removal -back   

 

                    Hospitalization History Flu- Hospitalized at [facility] in 

[place], [place]

## 2019-12-26 NOTE — XMS REPORT
[facility]

                             Created on: 2018



Jose Flores

External Reference #: [id number]

: 1980

Sex: Female



Demographics





                          Address                   [address] [place]  [address]

 

                          Preferred Language        Unknown

 

                          Marital Status            Unknown

 

                          Orthodoxy Affiliation     Unknown

 

                          Race                      Unknown

 

                          Ethnic Group              Unknown





Author





                          Author                    Jose CARLIN

 

                          Organization              [facility]

 

                          Address                   [address] [place]  [address]



 

                          Phone                     [phone]







Care Team Providers





                    Care Team Member Name Role                Phone

 

                    NISHA CARLIN     Unavailable         [phone]







PROBLEMS





          Type      Condition ICD9-CM Code SWM89-KQ Code Onset Dates Condition S

tatus SNOMED 

Code

 

          Problem   High risk sexual behavior           Z72.51              Acti

ve    [id number]

 

          Problem   Tinea versicolor           B36.0               Active    [id number]

[id number]

 

           Problem    Other specified diabetes mellitus without complications   

         E13.9                 Active

                                        [id number]

 

           Problem    Erectile dysfunction, unspecified erectile dysfunction typ

e            N52.9                 

Active                                  [id number]

 

          Problem   Hyperlipidemia, unspecified hyperlipidemia type           E7

8.5               Active    

[id number]







ALLERGIES

No Information



ENCOUNTERS





                Encounter       Location        Date            Diagnosis

 

                          [facility]     [address] [id number]

[address] [place] [phone]

                          07 Mar, 2018               

 

                          [facility]     [address] [id number]

[address] [place] [phone]

                                         

 

                          [facility]     [address] [id number]

[address] [place] [phone]

                                         

 

                          [facility]     [address] [id number]

[address] [place] [phone]

                                         

 

                          [facility]     [address] [id number]

[address] [place] [phone]

                          22 Dec, 2017               

 

                          [facility]     [address] [id number]

[address] [place] [phone]

                          07 Dec, 2017               

 

                          [facility]     [address] [id number]

[address] [place] [phone]

                                         

 

                          [facility]     [address] [id number]

[address] [place] [phone]

                          15 Nov, 2017              Tinea versicolor B36.0 ; Enc

ounter for immunization Z23 ; Other 

specified diabetes mellitus without complications E13.9 and High risk sexual 
behavior Z72.51

 

                          [facility]     [address] [id number]

[address] [place] [phone]

                          16 Oct, 2017               

 

                          [facility]     [address] [id number]

[address] [place] [phone]

                          22 Sep, 2017               

 

                          [facility]     [address] [id number]

[address] [place] [phone]

                          19 Sep, 2017               

 

                          [facility]     [address] [id number]

[address] [place] [phone]

                          19 Sep, 2017               

 

                          [facility]     [address] [id number]

[address] [place] [phone]

                          08 Sep, 2017               

 

                          [facility]     [address] [id number]

[address] [place] [phone]

                          31 Aug, 2017               

 

                          [facility]     [address] [id number]

[address] [place] [phone]

                          23 Aug, 2017               

 

                          [facility]     [address] [id number]

[address] [place] [phone]

                          23 Aug, 2017              Tinea versicolor B36.0 ; Oth

er specified diabetes mellitus without 

complications E13.9 and Acute pain of right shoulder M25.511

 

                          [facility]     [address] [id number]

[address] [place] [phone]

                          17 Aug, 2017               

 

                          [facility]     [address] [id number]

[address] [place] [phone]

                          17 Aug, 2017               

 

                          [facility]     [address] [id number]

[address] [place] [phone]

                                         

 

                          [facility]     [address] [id number]

[address] [place] [phone]

                                         

 

                          [facility]     [address] [id number]

[address] [place] [phone]

                                         

 

                          [facility]     [address] [id number]

[address] [place] [phone]

                                         

 

                          [facility]     [address] [facility] [id number]

[address] [place] [phone]

                                         

 

                          [facility]     [address] [facility] [id number]

[address] [place] [phone]

                                        Other specified diabetes steven

litus without complications E13.9 ; 

Erectile dysfunction, unspecified erectile dysfunction type N52.9 ; 
Hyperlipidemia, unspecified hyperlipidemia type E78.5 ; Tinea versicolor B36.0 
and High risk sexual behavior Z72.51

 

                          [facility]     [address] [facility] [id number]

[address] [place] [phone]

                                         







IMMUNIZATIONS

No Known Immunizations



SOCIAL HISTORY

Never Assessed



REASON FOR VISIT

pt requests return call



PLAN OF CARE





VITAL SIGNS





MEDICATIONS

Unknown Medications



RESULTS

No Results



PROCEDURES

No Known procedures



INSTRUCTIONS





MEDICATIONS ADMINISTERED

No Known Medications



MEDICAL (GENERAL) HISTORY





                    Type                Description         Date

 

                    Medical History     diabetes mellitus    

 

                    Medical History     hyperlipidemia       

 

                    Medical History     erectile dysfunction  

 

                    Medical History     anxiety attacks      

 

                    Medical History     depression           

 

                    Surgical History    tonsillectomy and adenoidectomy 

 

                    Surgical History    wisdom teeth extraction  

 

                    Surgical History    Lumpectomy on Tongue  

 

                    Surgical History    mole removal -back   

 

                    Hospitalization History Flu- Hospitalized at [facility] in 

[place], [place]

## 2020-01-01 ENCOUNTER — HOSPITAL ENCOUNTER (EMERGENCY)
Dept: HOSPITAL 75 - ER | Age: 40
Discharge: HOME | End: 2020-01-01
Payer: SELF-PAY

## 2020-01-01 VITALS — WEIGHT: 162.04 LBS | BODY MASS INDEX: 24.84 KG/M2 | HEIGHT: 67.72 IN

## 2020-01-01 VITALS — SYSTOLIC BLOOD PRESSURE: 138 MMHG | DIASTOLIC BLOOD PRESSURE: 77 MMHG

## 2020-01-01 DIAGNOSIS — E78.00: ICD-10-CM

## 2020-01-01 DIAGNOSIS — I10: ICD-10-CM

## 2020-01-01 DIAGNOSIS — E11.9: ICD-10-CM

## 2020-01-01 DIAGNOSIS — N45.3: Primary | ICD-10-CM

## 2020-01-01 LAB
APTT PPP: YELLOW S
BACTERIA #/AREA URNS HPF: (no result) /HPF
BILIRUB UR QL STRIP: NEGATIVE
FIBRINOGEN PPP-MCNC: CLEAR MG/DL
GLUCOSE UR STRIP-MCNC: (no result) MG/DL
KETONES UR QL STRIP: NEGATIVE
LEUKOCYTE ESTERASE UR QL STRIP: NEGATIVE
NITRITE UR QL STRIP: NEGATIVE
PH UR STRIP: 6 [PH] (ref 5–9)
PROT UR QL STRIP: (no result)
RBC #/AREA URNS HPF: (no result) /HPF
SP GR UR STRIP: >=1.03 (ref 1.02–1.02)
WAXY CASTS #/AREA URNS LPF: (no result) /LPF
WBC #/AREA URNS HPF: (no result) /HPF
WBC CASTS URNS QL MICRO: (no result) /LPF

## 2020-01-01 PROCEDURE — 87591 N.GONORRHOEAE DNA AMP PROB: CPT

## 2020-01-01 PROCEDURE — 86780 TREPONEMA PALLIDUM: CPT

## 2020-01-01 PROCEDURE — 87088 URINE BACTERIA CULTURE: CPT

## 2020-01-01 PROCEDURE — 87491 CHLMYD TRACH DNA AMP PROBE: CPT

## 2020-01-01 PROCEDURE — 96372 THER/PROPH/DIAG INJ SC/IM: CPT

## 2020-01-01 PROCEDURE — 36415 COLL VENOUS BLD VENIPUNCTURE: CPT

## 2020-01-01 PROCEDURE — 81000 URINALYSIS NONAUTO W/SCOPE: CPT

## 2020-01-01 NOTE — ED GU-MALE
General


Chief Complaint:  Male Reproductive


Stated Complaint:  SWOLLEN TESTICLES


Nursing Triage Note:  


PATIENT STATES THAT HIS TESTICLES ARE SWOLLEN AND THIS HAS BEEN GRADUALLY 


WORSENING OVER THE LAST WEEK AND A HALF.


Source:  patient


Exam Limitations:  no limitations





History of Present Illness


Date Seen by Provider:  Jan 1, 2020


Time Seen by Provider:  21:01


Initial Comments


Patient presents to ER by private conveyance with chief complaint of 7 days of 

progressively worsening swelling and pain in his scrotum. He has no painful 

dysuria or masturbation but he says when he masturbates he does not produce 

anything. He said a year ago he went to a urologist for the same problem and was

told it was not significant. Today however his scrotum is swollen and tender to 

palpation. He's never had any surgeries on his genitourinary tract. He does not 

have any blood in his urine. He is able to urinate. He is not having abdominal 

pain. He is not on antibiotics. He had STD testing several weeks ago to get back

on his medications to prevent HIV. He has not been sexually active since then 

but he would like STDs rechecked. He denies ever having STDs. He denies fevers 

or chills nausea vomiting or diarrhea. He rates the pain 7 out of 10.





Allergies and Home Medications


Allergies


Coded Allergies:  


     No Known Drug Allergies (Unverified , 11/30/19)





Home Medications


Albuterol Sulfate 1 Puff Puff, 2 PUFF IH Q4H PRN for WEAKNESS


   1 PUFF = 90 MCG 


   Prescribed by: ROWENA JACOME on 11/30/19 1827


Amoxicillin/Potassium Clav 1 Each Tablet, 1 EACH PO BID


   Prescribed by: ROWENA JACOME on 11/30/19 1827


Azithromycin 250 Mg Tablet, 250 MG PO UD


   TAKE 2 TABLETS ON DAY ONE THEN TAKE 1 TABLET DAILY FOR FOUR MORE DAYS 


   Prescribed by: ROWENA JACOME on 11/30/19 1827


Doxycycline Hyclate 100 Mg Tablet, 100 MG PO BID


   Prescribed by: MENG DOMINGUEZ on 1/1/20 2228





Patient Home Medication List


Home Medication List Reviewed:  Yes





Review of Systems


Review of Systems


Constitutional:  No chills, No fever, No malaise


EENTM:  No ear discharge, No ear pain


Respiratory:  No cough, No phlegm, No short of breath


Cardiovascular:  No chest pain, No edema


Gastrointestinal:  No abdominal pain, No nausea, No vomiting


Genitourinary:  see HPI; denies dysuria


Musculoskeletal:  No back pain, No joint pain


Skin:  No pruritus, No rash


Psychiatric/Neurological:  Denies Headache, Denies Numbness





Past Medical-Social-Family Hx


Patient Social History


Alcohol Use:  Denies Use


Recreational Drug Use:  No


Smoking Status:  Never a Smoker


2nd Hand Smoke Exposure:  No


Recent Foreign Travel:  No


Contact w/Someone Who Travel:  No


Recent Infectious Disease Expo:  No


Recent Hopitalizations:  No


Physical Abuse:  No


Sexual Abuse:  No


Mistreated:  No


Fear:  No





Seasonal Allergies


Seasonal Allergies:  No





Past Medical History


Surgeries:  Yes


Eye Surgery


Respiratory:  No


Cardiac:  Yes


High Cholesterol, Hypertension


Neurological:  No


Genitourinary:  No


Gastrointestinal:  No


Musculoskeletal:  Yes (CHARCOT FOOT)


Endocrine:  Yes


Diabetes, Insulin dep


HEENT:  No


Cancer:  No


Psychosocial:  No


Integumentary:  No





Physical Exam


Vital Signs





Vital Signs - First Documented








 1/1/20





 20:28


 


Temp 37.4


 


Pulse 95


 


Resp 18


 


B/P (MAP) 174/135 (148)


 


Pulse Ox 100





Capillary Refill : Less Than 3 Seconds


Height, Weight, BMI


Height: '"


Weight: lbs. oz. kg; 24.00 BMI


Method:


General Appearance:  WD/WN, no apparent distress


HEENT:  PERRL/EOMI, TMs normal, pharynx normal


Neck:  full range of motion, normal inspection


Cardiovascular:  normal peripheral pulses, regular rate, rhythm


Respiratory:  no respiratory distress, no accessory muscle use


Gastrointestinal:  normal bowel sounds, non tender, soft


Male:  testicular tenderness, other (scrotum is bilateral erythematous engorged 

and tender to palpation with mild induration of the soft tissue. No discharge 

from the penis.)


Neurologic/Psychiatric:  no motor/sensory deficits, alert, normal mood/affect





Progress/Results/Core Measures


Suspected Sepsis


Recent Fever Within 48 Hours:  No


Infection Criteria Present:  Suspected New Infection


New/Unexplained  Altered Menta:  No


Sepsis Screen:  No Definite Risk


SIRS


Temperature: 


Pulse: 95 


Respiratory Rate: 18


 


Blood Pressure 174 /135 


Mean: 148





Results/Orders


Lab Results





Laboratory Tests








Test


 1/1/20


21:00 1/1/20


21:14 Range/Units


 


 


Urine Color YELLOW    


 


Urine Clarity CLEAR    


 


Urine pH 6.0   5-9  


 


Urine Specific Gravity >=1.030   1.016-1.022  


 


Urine Protein 3+ H  NEGATIVE  


 


Urine Glucose (UA) TRACE H  NEGATIVE  


 


Urine Ketones NEGATIVE   NEGATIVE  


 


Urine Nitrite NEGATIVE   NEGATIVE  


 


Urine Bilirubin NEGATIVE   NEGATIVE  


 


Urine Urobilinogen 0.2   < = 1.0  MG/DL


 


Urine Leukocyte Esterase NEGATIVE   NEGATIVE  


 


Urine RBC (Auto) 2+ H  NEGATIVE  


 


Urine RBC 25-50 H   /HPF


 


Urine WBC 0-2    /HPF


 


Urine Crystals NONE    /LPF


 


Urine Bacteria FEW H   /HPF


 


Urine Casts PRESENT    /LPF


 


Urine Coarse Granular Casts 2-5 H   /LPF


 


Urine Waxy Casts 0-2 H   /LPF


 


Urine White Blood Cell Casts RARE H   /LPF


 


Urine Mucus NEGATIVE    /LPF


 


Urine Yeast NA H   /HPF


 


Urine Culture Indicated YES    








My Orders





Orders - MENG DOMINGUEZ


Ua Culture If Indicated (1/1/20 20:05)


Syphilis Antibody Screen (1/1/20 21:12)


Neis Alvaro Dna Urine Test (1/1/20 21:12)


Chlamydia Trachomatis Urine (1/1/20 21:12)


Hydrocodone/Apap 5/325 Tablet (Lortab 5 (1/1/20 21:15)


Ceftriaxone For Im Use (Rocephin For Im (1/1/20 21:30)


Lidocaine 1% Inj 20 Ml (Xylocaine 1% Inj (1/1/20 21:30)


Doxycycline Hyclate Tablet (Vibramycin T (1/1/20 21:22)


Urine Culture (1/1/20 21:00)





Medications Given in ED





Current Medications








 Medications  Dose


 Ordered  Sig/Tiffanie


 Route  Start Time


 Stop Time Status Last Admin


Dose Admin


 


 Acetaminophen/


 Hydrocodone Bitart  1 tab  ONCE  ONCE


 PO  1/1/20 21:15


 1/1/20 21:16 DC 1/1/20 21:20


1 TAB








Vital Signs/I&O











 1/1/20





 20:28


 


Temp 37.4


 


Pulse 95


 


Resp 18


 


B/P (MAP) 174/135 (148)


 


Pulse Ox 100





Capillary Refill : Less Than 3 Seconds








Blood Pressure Mean:                    148








Progress Note :  


   Time:  21:20


Progress Note


Hydrocodone for pain, syphilis, gonorrhea and chlamydia, urinalysis. Plan to 

give him 250 mg of Rocephin IM and put him out on doxycycline. Suspect orchitis 

or epididymitis. Since his been going on for over a week testicular torsion is 

less likely. We can set him up for an ultrasound outpatient tomorrow.





Departure


Impression





   Primary Impression:  


   Orchitis and epididymitis, unspecified


Disposition:  01 HOME, SELF-CARE


Condition:  Stable





Departure-Patient Inst.


Decision time for Depature:  21:45


Referrals:  


[facility] (PCP/Family)


Primary Care Physician


Patient Instructions:  Epididymitis (DC)





Add. Discharge Instructions:  


You have an infection and we have given you first doses of antibiotics tonight.


Tomorrow start taking doxycycline one capsule twice a day for the next 10 days.


Call the number on the outpatient order form for registration and scheduling 

first thing to set up an ultrasound tomorrow, 1/2/2020.


Ibuprofen 800 mg every 8 hours as needed for pain.


Hydrocodone 1-2 tablets every 6 hours as needed for pain. Hydrocodone will cause

drowsiness and constipation. Recommend MiraLAX every day that you're using 

hydrocodone.


Follow-up with your primary care provider for results.


The lab send outs will be at least 2 days before we have results.


All discharge instructions reviewed with patient and/or family. Voiced 

understanding.


Scripts


Hydrocodone Bit/Acetaminophen (Hydrocodone/Acetaminophen 5/325mg Tablet) 1 Tab 

Tab


1-2 EACH PO Q6H PRN for PAIN-MODERATE MDD 10 for 3 Days, #12 TAB 0 Refills


   Prov: MENG DOMINGUEZ         1/1/20 


Doxycycline Hyclate (Doxycycline Hyclate) 100 Mg Tablet


100 MG PO BID for 10 Days, #20 TAB 0 Refills


   Prov: MENG DOMINGUEZ         1/1/20











MENG DOMINGUEZ                  Jan 1, 2020 21:21

## 2020-01-02 ENCOUNTER — HOSPITAL ENCOUNTER (OUTPATIENT)
Dept: HOSPITAL 75 - RAD | Age: 40
End: 2020-01-02
Attending: INTERNAL MEDICINE
Payer: SELF-PAY

## 2020-01-02 DIAGNOSIS — N50.89: Primary | ICD-10-CM

## 2020-01-02 PROCEDURE — 76870 US EXAM SCROTUM: CPT

## 2020-01-02 NOTE — DIAGNOSTIC IMAGING REPORT
PROCEDURE: US Scrotum.



TECHNIQUE: Multiple real-time grayscale images were obtained over

the scrotum in various projections bilaterally.



INDICATION: Bilateral testicular swelling.



FINDINGS: Right testicle measures 4.1 x 3.3 x 3.6 cm and left

testicle measures 4.6 x 3.6 x 3.5 cm. Both testes show

homogeneous echotexture. No discrete testicular mass is seen.

There is blood flow to both testes. Epididymides are

unremarkable. There are small bilateral hydroceles. There is

significant scrotal wall thickening noted without evidence of

discrete fluid collection.



IMPRESSION:

1. No evidence of testicular mass or vascular compromise.

2. Thickened scrotal wall without evidence of discrete fluid

collection.



Dictated by: 



  Dictated on workstation # [id number]

## 2020-01-06 ENCOUNTER — HOSPITAL ENCOUNTER (EMERGENCY)
Dept: HOSPITAL 75 - ER | Age: 40
Discharge: OTHER INSTITUTION | End: 2020-01-06
Payer: SELF-PAY

## 2020-01-06 VITALS — HEIGHT: 67.99 IN | WEIGHT: 130.07 LBS | BODY MASS INDEX: 19.71 KG/M2

## 2020-01-06 VITALS — DIASTOLIC BLOOD PRESSURE: 116 MMHG | SYSTOLIC BLOOD PRESSURE: 175 MMHG

## 2020-01-06 VITALS — DIASTOLIC BLOOD PRESSURE: 117 MMHG | SYSTOLIC BLOOD PRESSURE: 179 MMHG

## 2020-01-06 DIAGNOSIS — I10: ICD-10-CM

## 2020-01-06 DIAGNOSIS — Z90.89: ICD-10-CM

## 2020-01-06 DIAGNOSIS — E78.00: ICD-10-CM

## 2020-01-06 DIAGNOSIS — N04.9: Primary | ICD-10-CM

## 2020-01-06 DIAGNOSIS — E11.9: ICD-10-CM

## 2020-01-06 LAB
ALBUMIN SERPL-MCNC: 3.1 GM/DL (ref 3.2–4.5)
ALP SERPL-CCNC: 121 U/L (ref 40–136)
ALT SERPL-CCNC: 38 U/L (ref 0–55)
APTT PPP: YELLOW S
BACTERIA #/AREA URNS HPF: NEGATIVE /HPF
BASOPHILS # BLD AUTO: 0.1 10^3/UL (ref 0–0.1)
BASOPHILS NFR BLD AUTO: 1 % (ref 0–10)
BILIRUB SERPL-MCNC: 0.3 MG/DL (ref 0.1–1)
BILIRUB UR QL STRIP: NEGATIVE
BUN/CREAT SERPL: 17
CALCIUM SERPL-MCNC: 8.4 MG/DL (ref 8.5–10.1)
CHLORIDE SERPL-SCNC: 115 MMOL/L (ref 98–107)
CO2 SERPL-SCNC: 19 MMOL/L (ref 21–32)
CREAT SERPL-MCNC: 3.03 MG/DL (ref 0.6–1.3)
EOSINOPHIL # BLD AUTO: 0.8 10^3/UL (ref 0–0.3)
EOSINOPHIL NFR BLD AUTO: 9 % (ref 0–10)
ERYTHROCYTE [DISTWIDTH] IN BLOOD BY AUTOMATED COUNT: 16.7 % (ref 10–14.5)
FIBRINOGEN PPP-MCNC: CLEAR MG/DL
GFR SERPLBLD BASED ON 1.73 SQ M-ARVRAT: 23 ML/MIN
GLUCOSE SERPL-MCNC: 103 MG/DL (ref 70–105)
GLUCOSE UR STRIP-MCNC: NEGATIVE MG/DL
HCT VFR BLD CALC: 31 % (ref 40–54)
HGB BLD-MCNC: 9.7 G/DL (ref 13.3–17.7)
INR PPP: 1.2 (ref 0.8–1.4)
KETONES UR QL STRIP: NEGATIVE
LEUKOCYTE ESTERASE UR QL STRIP: NEGATIVE
LYMPHOCYTES # BLD AUTO: 2.1 X 10^3 (ref 1–4)
LYMPHOCYTES NFR BLD AUTO: 22 % (ref 12–44)
MANUAL DIFFERENTIAL PERFORMED BLD QL: NO
MCH RBC QN AUTO: 28 PG (ref 25–34)
MCHC RBC AUTO-ENTMCNC: 32 G/DL (ref 32–36)
MCV RBC AUTO: 87 FL (ref 80–99)
MONOCYTES # BLD AUTO: 0.7 X 10^3 (ref 0–1)
MONOCYTES NFR BLD AUTO: 8 % (ref 0–12)
NEUTROPHILS # BLD AUTO: 5.7 X 10^3 (ref 1.8–7.8)
NEUTROPHILS NFR BLD AUTO: 61 % (ref 42–75)
NITRITE UR QL STRIP: NEGATIVE
PH UR STRIP: 5 [PH] (ref 5–9)
PLATELET # BLD: 323 10^3/UL (ref 130–400)
PMV BLD AUTO: 10.2 FL (ref 7.4–10.4)
POTASSIUM SERPL-SCNC: 5.4 MMOL/L (ref 3.6–5)
PROT SERPL-MCNC: 5.8 GM/DL (ref 6.4–8.2)
PROT UR QL STRIP: (no result)
PROTHROMBIN TIME: 16 SEC (ref 12.2–14.7)
RBC #/AREA URNS HPF: (no result) /HPF
SODIUM SERPL-SCNC: 139 MMOL/L (ref 135–145)
SP GR UR STRIP: >=1.03 (ref 1.02–1.02)
SQUAMOUS #/AREA URNS HPF: (no result) /HPF
T4 FREE SERPL-MCNC: 1 NG/DL (ref 0.7–1.48)
WBC # BLD AUTO: 9.3 10^3/UL (ref 4.3–11)
WBC #/AREA URNS HPF: (no result) /HPF

## 2020-01-06 PROCEDURE — 96374 THER/PROPH/DIAG INJ IV PUSH: CPT

## 2020-01-06 PROCEDURE — 81000 URINALYSIS NONAUTO W/SCOPE: CPT

## 2020-01-06 PROCEDURE — 71045 X-RAY EXAM CHEST 1 VIEW: CPT

## 2020-01-06 PROCEDURE — 80053 COMPREHEN METABOLIC PANEL: CPT

## 2020-01-06 PROCEDURE — 84439 ASSAY OF FREE THYROXINE: CPT

## 2020-01-06 PROCEDURE — 36415 COLL VENOUS BLD VENIPUNCTURE: CPT

## 2020-01-06 PROCEDURE — 93005 ELECTROCARDIOGRAM TRACING: CPT

## 2020-01-06 PROCEDURE — 84443 ASSAY THYROID STIM HORMONE: CPT

## 2020-01-06 PROCEDURE — 85025 COMPLETE CBC W/AUTO DIFF WBC: CPT

## 2020-01-06 PROCEDURE — 83880 ASSAY OF NATRIURETIC PEPTIDE: CPT

## 2020-01-06 PROCEDURE — 51702 INSERT TEMP BLADDER CATH: CPT

## 2020-01-06 PROCEDURE — 85610 PROTHROMBIN TIME: CPT

## 2020-01-06 NOTE — DIAGNOSTIC IMAGING REPORT
INDICATION: Lower respiratory infection.



EXAM: Portable chest at 2:53 PM



FINDINGS: There is cardiomegaly. Pulmonary vascularity is normal.

There is an infiltrate present at the right lung base with an

effusion.



IMPRESSION: Right basilar consolidation suspicious for pneumonia

with a parapneumonic effusion.



Dictated by: 



  Dictated on workstation # [id number]

## 2020-01-06 NOTE — ED GU-MALE
General


Chief Complaint:  Male Reproductive


Stated Complaint:  SWOLLEN TESTICLES


Nursing Triage Note:  


PT AMBULATE TO TRIAGE WITH C/O SWOLLEN TESTICLES. PT WAS SEEN IN THIS ED 


19 FOR THE SAME C/O. PT STATES DR NISHA CARLIN CANNOT SEE HIM UNTIL 


. PT REPORTS DIFFICULTY URINATING AND PAIN WALKING AND SITTING. PT C/O 


BURNING DURING URINATION.


Source:  patient


Exam Limitations:  no limitations





History of Present Illness


Date Seen by Provider:  2020


Time Seen by Provider:  14:26


Initial Comments


To ER with reports of swollen testicles. States this is been an ongoing issue 

for 2 weeks, he was seen here had an ultrasound of the scrotum done given 

antibiotics which resulted in minimal to no improvement. He denies shortness of 

breath chest pain or cough. He denies fevers or chills. He still has some mild 

dysuria.


Timing/Duration:  constant


Severity/Quality:  moderate


Location:  scrotal


Radiation:  none


Prior Genitourinary Problems:  none


Associated Symptoms:  dysuria





Allergies and Home Medications


Allergies


Coded Allergies:  


     No Known Drug Allergies (Unverified , 19)





Home Medications


Albuterol Sulfate 1 Puff Puff, 2 PUFF IH Q4H PRN for WEAKNESS


   1 PUFF = 90 MCG 


   Prescribed by: ROWENA JACOME on 19


Amoxicillin/Potassium Clav 1 Each Tablet, 1 EACH PO BID


   Prescribed by: ROWENA JACOME on 19


Azithromycin 250 Mg Tablet, 250 MG PO UD


   TAKE 2 TABLETS ON DAY ONE THEN TAKE 1 TABLET DAILY FOR FOUR MORE DAYS 


   Prescribed by: ROWENA JACOME on 19


Doxycycline Hyclate 100 Mg Tablet, 100 MG PO BID


   Prescribed by: MENG DOMINGUEZ on 20


Hydrocodone Bit/Acetaminophen 1 Tab Tab, 1-2 EACH PO Q6H PRN for PAIN-MODERATE


   Prescribed by: MENG DOMINGUEZ on 20





Patient Home Medication List


Home Medication List Reviewed:  Yes





Review of Systems


Review of Systems


Constitutional:  see HPI


EENTM:  see HPI


Respiratory:  no symptoms reported


Cardiovascular:  no symptoms reported


Genitourinary:  see HPI


Musculoskeletal:  no symptoms reported


Skin:  no symptoms reported


Psychiatric/Neurological:  No Symptoms Reported


Endocrine:  No Symptoms Reported


Hematologic/Lymphatic:  No Symptoms Reported





Past Medical-Social-Family Hx


Patient Social History


Alcohol Use:  Denies Use


Recreational Drug Use:  No


Smoking Status:  Never a Smoker


2nd Hand Smoke Exposure:  No


Recent Foreign Travel:  No


Contact w/Someone Who Travel:  No


Recent Infectious Disease Expo:  No


Recent Hopitalizations:  No


Physical Abuse:  No


Sexual Abuse:  No


Mistreated:  No


Fear:  No





Seasonal Allergies


Seasonal Allergies:  No





Past Medical History


Surgeries:  Yes


Adenoidectomy, Eye Surgery, Tonsillectomy


Respiratory:  No


Cardiac:  Yes


High Cholesterol, Hypertension


Neurological:  No


HIV/AIDS:  No


Genitourinary:  No


Gastrointestinal:  No


Musculoskeletal:  Yes (CHARCOT FOOT)


Endocrine:  Yes


Diabetes, Insulin dep


HEENT:  No


Cancer:  No


Psychosocial:  No


Integumentary:  No


Blood Disorders:  No





Physical Exam


Vital Signs





Vital Signs - First Documented








 20





 14:06


 


Temp 36.7


 


Pulse 95


 


Resp 17


 


B/P (MAP) 187/120 (142)


 


O2 Delivery Room Air





Capillary Refill : Less Than 3 Seconds


Height, Weight, BMI


Height: '"


Weight: lbs. oz. kg; 19.00 BMI


Method:


General Appearance:  WD/WN, no apparent distress, other (pale, appears 

chronically ill)


HEENT:  PERRL/EOMI, normal ENT inspection


Neck:  non-tender, full range of motion


Respiratory:  normal breath sounds, no respiratory distress, no accessory muscle

use


Gastrointestinal:  normal bowel sounds, non tender, soft


Extremities:  other (3+ pitting edema up to the knees)


Neurologic/Psychiatric:  alert, normal mood/affect, oriented x 3, other (insulin

dependent diabetic with insulin pump in place into the skin of the right 

abdomen)


Skin:  normal color, warm/dry





Progress/Results/Core Measures


Suspected Sepsis


Recent Fever Within 48 Hours:  No


Infection Criteria Present:  None


New/Unexplained  Altered Menta:  No


Sepsis Screen:  No Definite Risk


SIRS


Temperature: 


Pulse: 95 


Respiratory Rate: 17


 


Laboratory Tests


20 14:39: White Blood Count 9.3


Blood Pressure 187 /120 


Mean: 142


 





Laboratory Tests


20 14:39: 


Creatinine 3.03H, INR Comment 1.2, Platelet Count 323, Total Bilirubin 0.3








Results/Orders


Lab Results





Laboratory Tests








Test


 20


14:35 20


14:39 Range/Units


 


 


Urine Color YELLOW    


 


Urine Clarity CLEAR    


 


Urine pH 5.0   5-9  


 


Urine Specific Gravity >=1.030   1.016-1.022  


 


Urine Protein 3+ H  NEGATIVE  


 


Urine Glucose (UA) NEGATIVE   NEGATIVE  


 


Urine Ketones NEGATIVE   NEGATIVE  


 


Urine Nitrite NEGATIVE   NEGATIVE  


 


Urine Bilirubin NEGATIVE   NEGATIVE  


 


Urine Urobilinogen 0.2   < = 1.0  MG/DL


 


Urine Leukocyte Esterase NEGATIVE   NEGATIVE  


 


Urine RBC (Auto) 2+ H  NEGATIVE  


 


Urine RBC 0-2    /HPF


 


Urine WBC RARE    /HPF


 


Urine Squamous Epithelial


Cells RARE 


 


  /HPF





 


Urine Crystals NONE    /LPF


 


Urine Bacteria NEGATIVE    /HPF


 


Urine Casts NONE    /LPF


 


Urine Mucus NEGATIVE    /LPF


 


Urine Culture Indicated NO    


 


White Blood Count


 


 9.3 


 4.3-11.0


10^3/uL


 


Red Blood Count


 


 3.49 L


 4.35-5.85


10^6/uL


 


Hemoglobin  9.7 L 13.3-17.7  G/DL


 


Hematocrit  31 L 40-54  %


 


Mean Corpuscular Volume  87  80-99  FL


 


Mean Corpuscular Hemoglobin  28  25-34  PG


 


Mean Corpuscular Hemoglobin


Concent 


 32 


 32-36  G/DL





 


Red Cell Distribution Width  16.7 H 10.0-14.5  %


 


Platelet Count


 


 323 


 130-400


10^3/uL


 


Mean Platelet Volume  10.2  7.4-10.4  FL


 


Neutrophils (%) (Auto)  61  42-75  %


 


Lymphocytes (%) (Auto)  22  12-44  %


 


Monocytes (%) (Auto)  8  0-12  %


 


Eosinophils (%) (Auto)  9  0-10  %


 


Basophils (%) (Auto)  1  0-10  %


 


Neutrophils # (Auto)  5.7  1.8-7.8  X 10^3


 


Lymphocytes # (Auto)  2.1  1.0-4.0  X 10^3


 


Monocytes # (Auto)  0.7  0.0-1.0  X 10^3


 


Eosinophils # (Auto)


 


 0.8 H


 0.0-0.3


10^3/uL


 


Basophils # (Auto)


 


 0.1 


 0.0-0.1


10^3/uL


 


Prothrombin Time  16.0 H 12.2-14.7  SEC


 


INR Comment  1.2  0.8-1.4  


 


Sodium Level  139  135-145  MMOL/L


 


Potassium Level  5.4 H 3.6-5.0  MMOL/L


 


Chloride Level  115 H   MMOL/L


 


Carbon Dioxide Level  19 L 21-32  MMOL/L


 


Anion Gap  5  5-14  MMOL/L


 


Blood Urea Nitrogen  51 H 7-18  MG/DL


 


Creatinine


 


 3.03 H


 0.60-1.30


MG/DL


 


Estimat Glomerular Filtration


Rate 


 23 


  





 


BUN/Creatinine Ratio  17   


 


Glucose Level  103    MG/DL


 


Calcium Level  8.4 L 8.5-10.1  MG/DL


 


Corrected Calcium  9.1  8.5-10.1  MG/DL


 


Total Bilirubin  0.3  0.1-1.0  MG/DL


 


Aspartate Amino Transf


(AST/SGOT) 


 32 


 5-34  U/L





 


Alanine Aminotransferase


(ALT/SGPT) 


 38 


 0-55  U/L





 


Alkaline Phosphatase  121    U/L


 


B-Type Natriuretic Peptide  2717.8 H <100.0  PG/ML


 


Total Protein  5.8 L 6.4-8.2  GM/DL


 


Albumin  3.1 L 3.2-4.5  GM/DL


 


Thyroid Stimulating Hormone


(TSH) 


 1.53 


 0.35-4.94


UIU/ML


 


Free Thyroxine


 


 1.00 


 0.70-1.48


NG/DL








My Orders





Orders - ROWENA JACOME


Thyroid Stimulating Hormone (20 14:24)


Free T4 (Free Thyroxine) (20 14:24)


BNP (20 14:24)


Ua Culture If Indicated (20 14:24)


Ed Iv/Invasive Line Start (20 14:24)


Chest 1 View, Ap/Pa Only (20 14:24)


Cbc With Automated Diff (20 14:24)


Comprehensive Metabolic Panel (20 14:24)


Protime With Inr (20 14:24)


Furosemide  Injection (Lasix  Injection) (20 15:30)


Bailey Cath (20 15:30)


Hydralazine Injection (Apresoline Inject (20 15:45)


Ekg Tracing (20 15:41)





Medications Given in ED





Current Medications








 Medications  Dose


 Ordered  Sig/Tiffanie


 Route  Start Time


 Stop Time Status Last Admin


Dose Admin


 


 Furosemide  40 mg  ONCE  ONCE


 IVP  20 15:30


 20 15:31 DC 20 15:39


40 MG








Vital Signs/I&O











 20





 14:06


 


Temp 36.7


 


Pulse 95


 


Resp 17


 


B/P (MAP) 187/120 (142)


 


O2 Delivery Room Air





Capillary Refill : Less Than 3 Seconds








Blood Pressure Mean:                    142











Diagnostic Imaging





   Diagonstic Imaging:  Xray


Comments


NAME:   ROBLES OLIVIA


[facility] REC#:   [id number]


ACCOUNT#:   [id number]


PT STATUS:   REG ER


:   1980


PHYSICIAN:   ROWENA JACOME


ADMIT DATE:   20/ER


                                   ***Draft***


Date of Exam:20





CHEST 1 VIEW, AP/PA ONLY








INDICATION: Lower respiratory infection.





EXAM: Portable chest at 2:53 PM





FINDINGS: There is cardiomegaly. Pulmonary vascularity is normal.


There is an infiltrate present at the right lung base with an


effusion.





IMPRESSION: Right basilar consolidation suspicious for pneumonia


with a parapneumonic effusion.





  Dictated on workstation # [id number]








Dict:   20 1459


Trans:   20 1501


[facility] [id number]





Interpreted by:     MAULIK SHELTON MD


Electronically signed by:





Departure


Communication (Admissions)


With Dr. Rojas here would recommend transfer to facility with nephrology. I 

spoke with the intensivist at [facility] Dr. DAIGLE who would recommend he go to 

the medical floor to the hospitalist, then spoke with Dr. Tadeo from hospitalist

group, agrees to admit.





Impression





   Primary Impression:  


   Anasarca


   Additional Impression:  


   Nephrotic syndrome


Disposition:   ADMITTED AS INPATIENT


Condition:  Stable





Transfer


Transfer Reason:  Exceeds level of care


Time Spoke to Accepting Phy:  15:52





Departure-Patient Inst.


Referrals:  


[facility] (PCP/Family)


Primary Care Physician





Copy


Copies To 1:   SHITAL SANDERS PETER J APRN              2020 14:28

## 2020-02-28 VITALS — DIASTOLIC BLOOD PRESSURE: 106 MMHG | SYSTOLIC BLOOD PRESSURE: 167 MMHG

## 2020-02-28 RX ADMIN — SODIUM CHLORIDE SCH MG: 900 INJECTION, SOLUTION INTRAVENOUS at 13:23

## 2020-03-02 VITALS — SYSTOLIC BLOOD PRESSURE: 180 MMHG | DIASTOLIC BLOOD PRESSURE: 120 MMHG

## 2020-03-02 RX ADMIN — SODIUM CHLORIDE SCH MG: 900 INJECTION, SOLUTION INTRAVENOUS at 14:56

## 2020-03-04 VITALS — DIASTOLIC BLOOD PRESSURE: 122 MMHG | SYSTOLIC BLOOD PRESSURE: 189 MMHG

## 2020-03-04 RX ADMIN — SODIUM CHLORIDE SCH MG: 900 INJECTION, SOLUTION INTRAVENOUS at 10:14

## 2020-03-06 VITALS — SYSTOLIC BLOOD PRESSURE: 160 MMHG | DIASTOLIC BLOOD PRESSURE: 94 MMHG

## 2020-03-06 RX ADMIN — SODIUM CHLORIDE SCH MG: 900 INJECTION, SOLUTION INTRAVENOUS at 08:49

## 2020-03-09 ENCOUNTER — HOSPITAL ENCOUNTER (OUTPATIENT)
Dept: HOSPITAL 75 - SDC | Age: 40
LOS: 80 days | Discharge: HOME | End: 2020-05-28
Attending: INTERNAL MEDICINE
Payer: COMMERCIAL

## 2020-03-09 VITALS — HEIGHT: 67.72 IN | BODY MASS INDEX: 24.34 KG/M2 | WEIGHT: 158.73 LBS

## 2020-03-09 VITALS — SYSTOLIC BLOOD PRESSURE: 183 MMHG | DIASTOLIC BLOOD PRESSURE: 107 MMHG

## 2020-03-09 DIAGNOSIS — N18.3: Primary | ICD-10-CM

## 2020-03-09 DIAGNOSIS — D63.1: ICD-10-CM

## 2020-03-09 PROCEDURE — 96365 THER/PROPH/DIAG IV INF INIT: CPT

## 2020-03-11 ENCOUNTER — HOSPITAL ENCOUNTER (EMERGENCY)
Dept: HOSPITAL 75 - ER | Age: 40
Discharge: HOME | End: 2020-03-11
Payer: SELF-PAY

## 2020-03-11 VITALS — HEIGHT: 67.99 IN | BODY MASS INDEX: 24.06 KG/M2 | WEIGHT: 158.73 LBS

## 2020-03-11 VITALS — DIASTOLIC BLOOD PRESSURE: 113 MMHG | SYSTOLIC BLOOD PRESSURE: 178 MMHG
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
## 2020-03-11 DIAGNOSIS — S00.81XA: ICD-10-CM

## 2020-03-11 DIAGNOSIS — R40.2362: ICD-10-CM
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
## 2020-03-11 DIAGNOSIS — E78.00: ICD-10-CM

## 2020-03-11 DIAGNOSIS — I10: ICD-10-CM

## 2020-03-11 DIAGNOSIS — E11.9: ICD-10-CM

## 2020-03-11 DIAGNOSIS — R40.2252: ICD-10-CM

## 2020-03-11 DIAGNOSIS — Z79.4: ICD-10-CM
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
## 2020-03-11 DIAGNOSIS — R40.2142: ICD-10-CM

## 2020-03-11 DIAGNOSIS — Z91.040: ICD-10-CM

## 2020-03-11 DIAGNOSIS — S09.90XA: Primary | ICD-10-CM

## 2020-03-11 DIAGNOSIS — Z88.8: ICD-10-CM

## 2020-03-11 PROCEDURE — 99283 EMERGENCY DEPT VISIT LOW MDM: CPT

## 2020-03-11 NOTE — ED HEAD INJURY
General


Chief Complaint:  Trauma-Non Activation


Stated Complaint:  FALL/ HEAD INJURY


Source:  patient


Exam Limitations:  no limitations





History of Present Illness


Date Seen by Provider:  Mar 11, 2020


Time Seen by Provider:  20:25


Initial Comments


To ER per EMS with reports of a fall. He was walking down the streets in 

[place] on [address], tripped over a hole in the sidewalk and fell landing

face first. He has an abrasion to the forehead. He denies loss of consciousness,

denies anticoagulant use. Denies neck pain recalls all events and feels okay 

other than the abrasion to the forehead. He has chronic hypertension/renal 

disease and follows with Dr. Teixeira from nephrology appointment. His tetanus is 

up-to-date within the past 5 years.


Occurred:  just prior to arrival


Severity:  moderate


Location:  frontal


Method of Injury:  fell


Loss of Consciousness:  no loss of consciousness





Allergies and Home Medications


Allergies


Coded Allergies:  


     Statins-Hmg-Coa Reductase Inhibitor (Unverified  Adverse Reaction, Unknown,

2/28/20)


     latex (Unverified  Adverse Reaction, Unknown, 2/28/20)





Home Medications


Albuterol Sulfate 1 Puff Puff, 2 PUFF IH Q4H PRN for WEAKNESS


   1 PUFF = 90 MCG 


   Prescribed by: ROWENA JACOME on 11/30/19 1827


Aspirin 81 Mg Tablet.dr, 81 MG PO DAILY, (Reported)


Furosemide 40 Mg Tablet, 40 MG PO BID, (Reported)


Hydralazine HCl 50 Mg Tablet, 50 MG PO Q8H, (Reported)


Insulin Aspart 100 Unit/1 Ml Susp, 0 SQ UD, (Reported)


Insulin Glargine,Hum.rec.anlog 300 Unit/1 Ml Insuln.pen, 15 UNIT SQ DAILY, 

(Reported)


Metoprolol Tartrate 100 Mg Tablet, 100 MG PO TID, (Reported)


Multivitamin 1 Each Tablet, 1 EACH PO DAILY, (Reported)


Niacinamide 500 Mg Tablet, 500 MG PO DAILY, (Reported)


Omega 3 Polyunsat Fatty Acids 1,000 Mg Cap, 1,000 MG PO DAILY, (Reported)





Patient Home Medication List


Home Medication List Reviewed:  Yes





Review of Systems


Review of Systems


Constitutional:  see HPI


Eyes:  No Symptoms Reported


Cardiovascular:  no symptoms reported


Genitourinary:  no symptoms reported


Musculoskeletal:  no symptoms reported


Skin:  no symptoms reported


Psychiatric/Neurological:  No Symptoms Reported, Headache (no headache, head 

pain is localized to the half-dollar sized area of the scalp where the abrasion 

is at.)


Endocrine:  No Symptoms Reported


Hematologic/Lymphatic:  No Symptoms Reported





Past Medical-Social-Family Hx


Patient Social History


2nd Hand Smoke Exposure:  No


Recent Foreign Travel:  No


Contact w/Someone Who Travel:  No


Recent Hopitalizations:  No





Seasonal Allergies


Seasonal Allergies:  No





Past Medical History


Surgeries:  Yes


Adenoidectomy, Eye Surgery, Tonsillectomy


Respiratory:  No


Cardiac:  Yes


High Cholesterol, Hypertension


Neurological:  No


HIV/AIDS:  No


Genitourinary:  No


Gastrointestinal:  No


Musculoskeletal:  Yes (CHARCOT FOOT)


Endocrine:  Yes


Diabetes, Insulin dep


HEENT:  No


Cancer:  No


Psychosocial:  No


Integumentary:  No


Blood Disorders:  No





Physical Exam


Vital Signs


Capillary Refill :


Height, Weight, BMI


Height: '"


Weight: lbs. oz. kg; 19.00 BMI


Method:


General Appearance:  WD/WN, no apparent distress


HEENT:  PERRL/EOMI, normal ENT inspection, other (half-dollar sized abrasion to 

the center of the forehead just inferior to the hairline. No West sign or 

raccoon eyes no depressed palpable skull fracture. No signs that would warrant 

CT imaging.)


Neck:  non-tender, full range of motion, other (rigid cervical collar removed 

upon arrival to ER, can flex chin to chest and turn head to either direction.)


Cardiovascular:  regular rate, rhythm, no murmur


Respiratory:  no respiratory distress, no accessory muscle use


Extremities:  normal range of motion, non-tender


Psychiatric:  alert, oriented x 3


Crainal Nerves:  normal hearing, normal speech, PERRL


Skin:  normal color, warm/dry





Tierney Coma Score


Best Eye Response:  (4) Open Spontaneously


Best Verbal Response:  (5) Oriented


Best Motor Response:  (6) Obeys Commands


Tierney Total:  15





Departure


Impression





   Primary Impression:  


   Forehead abrasion


   Qualified Codes:  S00.81XA - Abrasion of other part of head, initial 

   encounter


   Additional Impression:  


   Fall


   Qualified Codes:  W19.XXXA - Unspecified fall, initial encounter


Disposition:  01 HOME, SELF-CARE


Condition:  Stable





Departure-Patient Inst.


Decision time for Depature:  20:29


Referrals:  


[facility] (PCP/Family)


Primary Care Physician


Patient Instructions:  Skin Abrasions





Add. Discharge Instructions:  


1. Return to ER for any loss of consciousness, severe intolerable headache, 

recurrent vomiting or any other concerns. At that point you would warrant a CT 

scan of the head.





All discharge instructions reviewed with patient and/or family. Voiced 

understanding.











ROWENA JACOME             Mar 11, 2020 20:31

## 2020-03-13 NOTE — XMS REPORT
Continuity of Care Document

                             Created on: 2020



Jose Mcmullen

External Reference #: [id number]

: 1980

Sex: Male



Demographics





                          Address                   [address] [place]  [address]

 

                          Home Phone                [phone] x

 

                          Preferred Language        Unknown

 

                          Marital Status            Unknown

 

                          Buddhist Affiliation     Unknown

 

                          Race                      Unknown

 

                          Ethnic Group              Unknown





Author





                          Organization              Unknown

 

                          Address                   Unknown

 

                          Phone                     Unavailable



              



Allergies

      



             Active           Description           Code           Type         

  Severity   

                Reaction           Onset           Reported/Identified          

 

Relationship to Patient                 Clinical Status        

 

                Yes             No Known Drug Allergies           [id number]    

       Drug 

Allergy           Unknown           N/A                             2019  

      

                                                             

 

             Yes           latex           [id number]           Drug Allergy    

       

Unknown           N/A                        2020                         

  

     

 

                    Yes                 Statins-Hmg-Coa Reductase Inhibitor     

      [id number]          

             Drug Allergy           Unknown           N/A                       

 2020   

                                                             



                      



Medications

      



There is no data.                  



Problems

      



             Date Dx Coded           Attending           Type           Code    

       

Diagnosis                               Diagnosed By        

 

             2019           ROWENA JACOME           Ot           E10

.9           

TYPE 1 DIABETES MELLITUS WITHOUT COMPLIC                    

 

             2019           ROWENA JACOME           Ot           J18

.9           

PNEUMONIA, UNSPECIFIED ORGANISM                    

 

             2019           ROWENA JACOME           Ot           R05

           

COUGH                                            

 

             2019           ROWENA JACOME           Ot           Z79

.4           

LONG TERM (CURRENT) USE OF INSULIN                    

 

             2019           ROWENA JACOME           Ot           E10

.9           

TYPE 1 DIABETES MELLITUS WITHOUT COMPLIC                    

 

             2019           ROWENA JACOME           Ot           J18

.9           

PNEUMONIA, UNSPECIFIED ORGANISM                    

 

             2019           ROWENA JACOME           Ot           R05

           

COUGH                                            

 

             2019           ROWENA JACOME           Ot           Z79

.4           

LONG TERM (CURRENT) USE OF INSULIN                    

 

                2020           MAULIK GARVIN MD           Ot        

      E11.9        

                          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              

      

 

                2020           MAULIK GARVIN MD           Ot        

      E78.00       

                          PURE HYPERCHOLESTEROLEMIA, UNSPECIFIED                

    

 

                2020           MAULIK GARVIN MD           Ot        

      I10          

                          ESSENTIAL (PRIMARY) HYPERTENSION                    

 

                2020           MAULIK GARVIN MD           Ot        

      N45.1        

                          EPIDIDYMITIS                       

 

                2020           MAULIK GARVIN MD, Ot        

      N45.3        

                          EPIDIDYMO-ORCHITIS                    

 

                2020           NISHA MACEDO MD           Ot           

   N50.89          

                          OTHER SPECIFIED DISORDERS OF THE MALE GE              

      

 

             2020           ROWENA JACOME           Ot           E11

.9           

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                    

 

                2020           ROWENA JACOME           Ot           

   E78.00          

                          PURE HYPERCHOLESTEROLEMIA, UNSPECIFIED                

    

 

             2020           ROWENA JACOME           Ot           I10

           

ESSENTIAL (PRIMARY) HYPERTENSION                    

 

             2020           ROWENA JACOME           Ot           N04

.9           

NEPHROTIC SYNDROME WITH UNSPECIFIED MORP                    

 

             2020           ROWENA JACOME           Ot           R60

.1           

GENERALIZED EDEMA                                

 

                2020           ROWENA JACOME           Ot           

   Z90.89          

                          ACQUIRED ABSENCE OF OTHER ORGANS                    

 

                2020           NISHA MACEDO MD           Ot           

   N50.89          

                          OTHER SPECIFIED DISORDERS OF THE MALE GE              

      

 

                2020           MAULIK GARVIN MD, Ot        

      E11.9        

                          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              

      

 

                2020           MAULIK GARVIN MD           Ot        

      E78.00       

                          PURE HYPERCHOLESTEROLEMIA, UNSPECIFIED                

    

 

                2020           MAULIK GARVIN MD           Ot        

      I10          

                          ESSENTIAL (PRIMARY) HYPERTENSION                    

 

                2020           MAULIK GARVIN MD           Ot        

      N45.1        

                          EPIDIDYMITIS                       

 

                2020           MAULIK GARVIN MD           Ot        

      N45.3        

                          EPIDIDYMO-ORCHITIS                    

 

             2020           JACOEMROWENA CAROLINA           Ot           E11

.9           

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                    

 

                2020           ROWENA JACOME           Ot           

   E78.00          

                          PURE HYPERCHOLESTEROLEMIA, UNSPECIFIED                

    

 

             2020           JACOMEROWENA CAROLINA           Ot           I10

           

ESSENTIAL (PRIMARY) HYPERTENSION                    

 

             2020           ROWENA JACOME           Ot           N04

.9           

NEPHROTIC SYNDROME WITH UNSPECIFIED MORP                    

 

             2020           JACOMEROWENA CAROLINA           Ot           R60

.1           

GENERALIZED EDEMA                                

 

                2020           ROWENA JACOME           Ot           

   Z90.89          

                          ACQUIRED ABSENCE OF OTHER ORGANS                    

 

                2020           NISHA MACEDO MD           Ot           

   N50.89          

                          OTHER SPECIFIED DISORDERS OF THE MALE GE              

      

 

                2020           NISHA MACEDO MD           Ot           

   N50.89          

                          OTHER SPECIFIED DISORDERS OF THE MALE GE              

      

 

                2020           NISHA MACEDO MD           Ot           

   N50.89          

                          OTHER SPECIFIED DISORDERS OF THE MALE GE              

      

 

                2020           HUANG HOLLY MD           Ot         

     D63.1         

                          ANEMIA IN CHRONIC KIDNEY DISEASE                    

 

                2020           HUANG HOLLY MD           Ot         

     N18.3         

                          CHRONIC KIDNEY DISEASE, STAGE 3 (MODERAT              

      

 

                2020           ELAYNE PINZON, HUANG           Ot         

     D63.1         

                          ANEMIA IN CHRONIC KIDNEY DISEASE                    

 

                2020           ABORUBEN PINZON, HUANG           Ot         

     N18.3         

                          CHRONIC KIDNEY DISEASE, STAGE 3 (MODERAT              

      

 

                2020           ELAYNE PINZON, HUANG           Ot         

     D63.1         

                          ANEMIA IN CHRONIC KIDNEY DISEASE                    

 

                2020           ABORUBEN PINZON, HUANG           Ot         

     N18.3         

                          CHRONIC KIDNEY DISEASE, STAGE 3 (MODERAT              

      

 

                2020           ELAYNE PINZON, HUANG           Ot         

     D63.1         

                          ANEMIA IN CHRONIC KIDNEY DISEASE                    

 

                2020           ABORUBEN PINZON, HUANG           Ot         

     N18.3         

                          CHRONIC KIDNEY DISEASE, STAGE 3 (MODERAT              

      

 

                2020           ELAYNE PINZON, HUANG           Ot         

     D63.1         

                          ANEMIA IN CHRONIC KIDNEY DISEASE                    

 

                2020           ABORUBEN PINZON, HUANG           Ot         

     N18.3         

                          CHRONIC KIDNEY DISEASE, STAGE 3 (MODERAT              

      

 

                2020           ELAYNE PINZON, HUANG           Ot         

     D63.1         

                          ANEMIA IN CHRONIC KIDNEY DISEASE                    

 

                2020           ABORUBEN PINZON, HUANG           Ot         

     N18.3         

                          CHRONIC KIDNEY DISEASE, STAGE 3 (MODERAT              

      

 

                2020           ELAYNE PINZON, HUANG           Ot         

     D63.1         

                          ANEMIA IN CHRONIC KIDNEY DISEASE                    

 

                2020           ELAYNE PINZON, HUANG           Ot         

     N18.3         

                          CHRONIC KIDNEY DISEASE, STAGE 3 (MODERAT              

      



                                                                                
                                 



Procedures

      



There is no data.                  



Results

      



                    Test                Result              Range        

 

                                        Complete blood count (CBC) with automate

d white blood cell (WBC) differential - 

19 17:22         

 

                          Blood leukocytes automated count (number/volume)      

     8.6 10*3/uL          

                                        4.3-11.0        

 

                          Blood erythrocytes automated count (number/volume)    

       3.27 10*6/uL       

                                        4.35-5.85        

 

                    Venous blood hemoglobin measurement (mass/volume)           

9.2 g/dL            

13.3-17.7        

 

                    Blood hematocrit (volume fraction)           28 %           

     40-54        

 

                    Automated erythrocyte mean corpuscular volume           86 [

foz_us]           

80-99        

 

                                        Automated erythrocyte mean corpuscular h

emoglobin (mass per erythrocyte)        

                          28 pg                     25-34        

 

                                        Automated erythrocyte mean corpuscular h

emoglobin concentration measurement 

(mass/volume)             33 g/dL                   32-36        

 

                    Automated erythrocyte distribution width ratio           14.

2 %              10.0-

14.5        

 

                    Automated blood platelet count (count/volume)           265 

10*3/uL           

130-400        

 

                          Automated blood platelet mean volume measurement      

     9.8 [foz_us]         

                                        7.4-10.4        

 

                    Automated blood neutrophils/100 leukocytes           70 %   

             42-75       

 

 

                    Automated blood lymphocytes/100 leukocytes           20 %   

             12-44       

 

 

                    Blood monocytes/100 leukocytes           9 %                

 0-12        

 

                    Automated blood eosinophils/100 leukocytes           1 %    

             0-10        

 

                    Automated blood basophils/100 leukocytes           1 %      

           0-10        

 

                    Blood neutrophils automated count (number/volume)           

6.0 10*3            

1.8-7.8        

 

                    Blood lymphocytes automated count (number/volume)           

1.7 10*3            

1.0-4.0        

 

                    Blood monocytes automated count (number/volume)           0.

7 10*3            

0.0-1.0        

 

                    Automated eosinophil count           0.1 10*3/uL           0

.0-0.3        

 

                    Automated blood basophil count (count/volume)           0.0 

10*3/uL           

0.0-0.1        

 

                                        Comprehensive metabolic panel - 19

 17:22         

 

                          Serum or plasma sodium measurement (moles/volume)     

      137 mmol/L          

                                        135-145        

 

                          Serum or plasma potassium measurement (moles/volume)  

         5.4 mmol/L       

                                        3.6-5.0        

 

                          Serum or plasma chloride measurement (moles/volume)   

        109 mmol/L        

                                                

 

                    Carbon dioxide           18 mmol/L           21-32        

 

                          Serum or plasma anion gap determination (moles/volume)

           10 mmol/L      

                                        5-14        

 

                          Serum or plasma urea nitrogen measurement (mass/volume

)           31 mg/dL      

                                        7-18        

 

                          Serum or plasma creatinine measurement (mass/volume)  

         2.84 mg/dL       

                                        0.60-1.30        

 

                    Serum or plasma urea nitrogen/creatinine mass ratio         

  11                  NRG 

       

 

                                        Serum or plasma creatinine measurement w

ith calculation of estimated glomerular 

filtration rate           25                        NRG        

 

                    Serum or plasma glucose measurement (mass/volume)           

48 mg/dL            

        

 

                    Serum or plasma calcium measurement (mass/volume)           

8.5 mg/dL           

8.5-10.1        

 

                          Serum or plasma total bilirubin measurement (mass/volu

me)           0.4 mg/dL   

                                        0.1-1.0        

 

                                        Serum or plasma alkaline phosphatase emy

surement (enzymatic activity/volume)    

                          86 U/L                            

 

                                        Serum or plasma aspartate aminotransfera

se measurement (enzymatic 

activity/volume)           35 U/L                    5-34        

 

                                        Serum or plasma alanine aminotransferase

 measurement (enzymatic activity/volume)

                          40 U/L                    0-55        

 

                    Serum or plasma protein measurement (mass/volume)           

5.9 g/dL            

6.4-8.2        

 

                    Serum or plasma albumin measurement (mass/volume)           

3.0 g/dL            

3.2-4.5        

 

                    CALCIUM CORRECTED           9.3 mg/dL           8.5-10.1    

    

 

                                        Capillary blood glucose measurement by g

lucometer (mass/volume) - 19 19:34

         

 

                          Capillary blood glucose measurement by glucometer (mas

s/volume)           62 

mg/dL                                           

 

                                        Capillary blood glucose measurement by g

lucometer (mass/volume) - 19 20:07

         

 

                          Capillary blood glucose measurement by glucometer (mas

s/volume)           98 

mg/dL                                           

 

                                        MICROALBUMIN/CREATININE RATIO, URINE - 1

19 15:04         

 

                    CREATININE, RANDOM URINE           74 mg/dL            20-32

0        

 

                    MICROALBUMIN           387.2 mg/dL           See Note:      

  

 

                          MICROALBUMIN/CREATININE RATIO, RANDOM URINE           

5232 mcg/mg creat         

                                        <30        

 

                                        TSH - 19 15:04         

 

                    TSH                 1.91 mIU/L           0.40-4.50        

 

                                        Bacterial urine culture - 20 21:00

         

 

                    Bacterial urine culture           NG                  NRG   

     

 

                                        Chlamydia DNA amp probe, urine - 

0 21:14         

 

                    Chlamydia DNA amp probe, urine           Not Detected       

     Not Detected   

     

 

                                        Urine Neisseria gonorrhoeae DNA assay - 

20 21:14         

 

                    Gonorrhea amp DNA-urine           Not Detected            No

t Detected        

 

                                        Serum reagin antibody assay (units/volum

e) by RPR - 20 21:14         

 

                          Serum reagin antibody assay (units/volume) by [facility]     

      Non-Reactive        

                                        Non-Reactive        

 

                                        Complete urinalysis with reflex to cultu

re - 20 14:35         

 

                    Urine color determination           YELLOW              NRG 

       

 

                    Urine clarity determination           CLEAR               NR

G        

 

                    Urine pH measurement by test strip           5.0            

     5-9        

 

                    Specific gravity of urine by test strip           >=        

          1.016-1.022     

   

 

                    Urine protein assay by test strip, semi-quantitative        

   3+                  

NEGATIVE        

 

                    Urine glucose detection by automated test strip           NE

GATIVE            

NEGATIVE        

 

                          Erythrocytes detection in urine sediment by light micr

oscopy           2+       

                                        NEGATIVE        

 

                    Urine ketones detection by automated test strip           NE

GATIVE            

NEGATIVE        

 

                    Urine nitrite detection by test strip           NEGATIVE    

        NEGATIVE    

    

 

                    Urine total bilirubin detection by test strip           NEGA

TIVE            

NEGATIVE        

 

                          Urine urobilinogen measurement by automated test strip

 (mass/volume)           

0.2 mg/dL                               < = 1.0        

 

                    Urine leukocyte esterase detection by dipstick           NEG

ATIVE            

NEGATIVE        

 

                                        Automated urine sediment erythrocyte cou

nt by microscopy (number/high power 

field)                     [HPF]                    NRG        

 

                                        Automated urine sediment leukocyte count

 by microscopy (number/high power field)

                          RARE                      NRG        

 

                          Bacteria detection in urine sediment by light microsco

py           NEGATIVE     

                                        NRG        

 

                                        Squamous epithelial cells detection in u

rine sediment by light microscopy       

                          RARE                      NRG        

 

                          Crystals detection in urine sediment by light microsco

py           NONE         

                                        NRG        

 

                    Casts detection in urine sediment by light microscopy       

    NONE                

NRG        

 

                          Mucus detection in urine sediment by light microscopy 

          NEGATIVE        

                                        NRG        

 

                    Complete urinalysis with reflex to culture           NO     

             NRG        

 

                                        Complete blood count (CBC) with automate

d white blood cell (WBC) differential - 

20 14:39         

 

                          Blood leukocytes automated count (number/volume)      

     9.3 10*3/uL          

                                        4.3-11.0        

 

                          Blood erythrocytes automated count (number/volume)    

       3.49 10*6/uL       

                                        4.35-5.85        

 

                    Venous blood hemoglobin measurement (mass/volume)           

9.7 g/dL            

13.3-17.7        

 

                    Blood hematocrit (volume fraction)           31 %           

     40-54        

 

                    Automated erythrocyte mean corpuscular volume           87 [

foz_us]           

80-99        

 

                                        Automated erythrocyte mean corpuscular h

emoglobin (mass per erythrocyte)        

                          28 pg                     25-34        

 

                                        Automated erythrocyte mean corpuscular h

emoglobin concentration measurement 

(mass/volume)             32 g/dL                   32-36        

 

                    Automated erythrocyte distribution width ratio           16.

7 %              10.0-

14.5        

 

                    Automated blood platelet count (count/volume)           323 

10*3/uL           

130-400        

 

                          Automated blood platelet mean volume measurement      

     10.2 [foz_us]        

                                        7.4-10.4        

 

                    Automated blood neutrophils/100 leukocytes           61 %   

             42-75       

 

 

                    Automated blood lymphocytes/100 leukocytes           22 %   

             12-44       

 

 

                    Blood monocytes/100 leukocytes           8 %                

 0-12        

 

                    Automated blood eosinophils/100 leukocytes           9 %    

             0-10        

 

                    Automated blood basophils/100 leukocytes           1 %      

           0-10        

 

                    Blood neutrophils automated count (number/volume)           

5.7 10*3            

1.8-7.8        

 

                    Blood lymphocytes automated count (number/volume)           

2.1 10*3            

1.0-4.0        

 

                    Blood monocytes automated count (number/volume)           0.

7 10*3            

0.0-1.0        

 

                    Automated eosinophil count           0.8 10*3/uL           0

.0-0.3        

 

                    Automated blood basophil count (count/volume)           0.1 

10*3/uL           

0.0-0.1        

 

                                        PT panel in platelet poor plasma by coag

ulation assay - 20 14:39         

 

                          Prothrombin time (PT) in platelet poor plasma by coagu

lation assay           

16.0 s                                  12.2-14.7        

 

                          INR in platelet poor plasma or blood by coagulation as

say           1.2         

                                        0.8-1.4        

 

                                        Comprehensive metabolic panel - 20

 14:39         

 

                          Serum or plasma sodium measurement (moles/volume)     

      139 mmol/L          

                                        135-145        

 

                          Serum or plasma potassium measurement (moles/volume)  

         5.4 mmol/L       

                                        3.6-5.0        

 

                          Serum or plasma chloride measurement (moles/volume)   

        115 mmol/L        

                                                

 

                    Carbon dioxide           19 mmol/L           21-32        

 

                          Serum or plasma anion gap determination (moles/volume)

           5 mmol/L       

                                        5-14        

 

                          Serum or plasma urea nitrogen measurement (mass/volume

)           51 mg/dL      

                                        7-18        

 

                          Serum or plasma creatinine measurement (mass/volume)  

         3.03 mg/dL       

                                        0.60-1.30        

 

                    Serum or plasma urea nitrogen/creatinine mass ratio         

  17                  NRG 

       

 

                                        Serum or plasma creatinine measurement w

ith calculation of estimated glomerular 

filtration rate           23                        NRG        

 

                    Serum or plasma glucose measurement (mass/volume)           

103 mg/dL           

        

 

                    Serum or plasma calcium measurement (mass/volume)           

8.4 mg/dL           

8.5-10.1        

 

                          Serum or plasma total bilirubin measurement (mass/volu

me)           0.3 mg/dL   

                                        0.1-1.0        

 

                                        Serum or plasma alkaline phosphatase emy

surement (enzymatic activity/volume)    

                          121 U/L                           

 

                                        Serum or plasma aspartate aminotransfera

se measurement (enzymatic 

activity/volume)           32 U/L                    5-34        

 

                                        Serum or plasma alanine aminotransferase

 measurement (enzymatic activity/volume)

                          38 U/L                    0-55        

 

                    Serum or plasma protein measurement (mass/volume)           

5.8 g/dL            

6.4-8.2        

 

                    Serum or plasma albumin measurement (mass/volume)           

3.1 g/dL            

3.2-4.5        

 

                    CALCIUM CORRECTED           9.1 mg/dL           8.5-10.1    

    

 

                                        Serum or plasma lithium measurement (mol

es/volume) - 20 14:39         

 

                    BNP PT              2717.8 pg/mL           <100.0        

 

                                        THYROID STIMULATING HORMONE - 20 1

4:39         

 

                    THYROID STIMULATING HORMONE           1.53 u[iU]/mL         

  0.35-4.94        

 

                                        Serum or plasma thyroxine (T4) free abhi

urement (mass/volume) - 20 14:39  

       

 

                          Serum or plasma thyroxine (T4) free measurement (mass/

volume)           1.00 

ng/dL                                   0.70-1.48        

 

                                        UA W/ MICROSCOPY - 20 10:12       

  

 

                    COLOR               YELLOW              YELLOW        

 

                    APPEARANCE           CLEAR               CLEAR        

 

                    SPECIFIC GRAVITY           1.019               1.001-1.035  

      

 

                    PH                  5.5                 5.0-8.0        

 

                    GLUCOSE             1+                  NEGATIVE        

 

                    BILIRUBIN           NEGATIVE            NEGATIVE        

 

                    KETONES             NEGATIVE            NEGATIVE        

 

                    OCCULT BLOOD           1+                  NEGATIVE        

 

                    PROTEIN             3+                  NEGATIVE        

 

                    NITRITE             NEGATIVE            NEGATIVE        

 

                    LEUKOCYTE ESTERASE           NEGATIVE            NEGATIVE   

     

 

                    WBC                 NONE SEEN /HPF           < OR = 5       

 

 

                    RBC                 3-10 /HPF           < OR = 2        

 

                    SQUAMOUS EPITHELIAL CELLS           NONE SEEN /HPF          

 < OR = 5        

 

                    BACTERIA            NONE SEEN /HPF           NONE SEEN      

  

 

                    HYALINE CAST           10-20 /LPF           NONE SEEN       

 

 

                                        RENAL PROFILE - 20 09:16         

 

                    GLUCOSE             135 mg/dL                   

 

                    UREA NITROGEN (BUN)           49 mg/dL            7-25      

  

 

                    CREATININE           2.80 mg/dL           0.60-1.35        

 

                    eGFR NON-AFR. AMERICAN           28 mL/min/1.73m2           

> OR = 60        

 

                    eGFR            32 mL/min/1.73m2           >

 OR = 60        

 

                    BUN/CREATININE RATIO           18 (calc)           6-22     

   

 

                    SODIUM              141 mmol/L           135-146        

 

                    POTASSIUM           5.3 mmol/L           3.5-5.3        

 

                    CHLORIDE            111 mmol/L                   

 

                    CARBON DIOXIDE           25 mmol/L           20-32        

 

                    CALCIUM             8.3 mg/dL           8.6-10.3        

 

                    ALBUMIN             3.0 g/dL            3.6-5.1        

 

                    PHOSPHATE (AS PHOSPHORUS)           4.5 mg/dL           2.5-

4.5        



                                                    



Encounters

      



                ACCT No.           Visit Date/Time           Discharge          

 Status         

             Pt. Type           Provider           Facility           Loc./Unit 

          

Complaint        

 

                [id number]           2020 09:00:00           2020 23:59:

59           CLS

                Outpatient           NISHA MACEDO MD                        

   [facility]                                  

 

             [id number]           2020 08:40:00                              

       Document

 Registration                                                                   

 

 

             [id number]           2020 09:00:00                              

       Document

 Registration                                                                   

 

 

             [id number]           2019 13:20:00                              

       Document

 Registration                                                                   

 

 

                    [id number]           2020 20:15:00           

020 20:33:00        

                DIS             Emergency           JACOME, PETER J APRN         

  [facility]           ER                        FALL/ HEAD INJURY      

  

 

                    [id number]           2020 08:28:00            23:59:59        

                CLS             Outpatient           ELAYNE PINZON, HUANG      

     [facility] 

[facility]                       IRON DEF ANEMIA

        

 

                    [id number]           2020 13:58:00            17:24:00        

                DIS             Emergency           ROWENA JACOME APRN         

  [facility]           ER                        SWOLLEN TESTICLES      

  

 

                    [id number]           2020 10:07:00            23:59:59        

                CLS             Outpatient           REMINGTON PINZON, NISHA SANCHEZ        

   [facility]           RAD                       BILATERAL TESTICULAR SW

[place]      

  

 

                    [id number]           2020 20:04:00            22:04:00        

                DIS             Emergency           BHARATHI PINZON, MAULIK CALDERON      

     [facility] 

[facility]           ER                        SWOLLEN TESTICL

ES        

 

                    [id number]           2019 16:00:00            20:21:00        

                DIS             Emergency           ROWENA JACOME         

  [facility]           ER                        COUGH / CONGESTION / BA

CK PAIN

## 2020-05-08 ENCOUNTER — HOSPITAL ENCOUNTER (INPATIENT)
Dept: HOSPITAL 75 - ER | Age: 40
LOS: 4 days | Discharge: HOME | DRG: 699 | End: 2020-05-12
Attending: FAMILY MEDICINE | Admitting: INTERNAL MEDICINE
Payer: COMMERCIAL

## 2020-05-08 VITALS — SYSTOLIC BLOOD PRESSURE: 186 MMHG | DIASTOLIC BLOOD PRESSURE: 125 MMHG

## 2020-05-08 VITALS — DIASTOLIC BLOOD PRESSURE: 129 MMHG | SYSTOLIC BLOOD PRESSURE: 188 MMHG

## 2020-05-08 VITALS — SYSTOLIC BLOOD PRESSURE: 180 MMHG | DIASTOLIC BLOOD PRESSURE: 110 MMHG

## 2020-05-08 VITALS — DIASTOLIC BLOOD PRESSURE: 105 MMHG | SYSTOLIC BLOOD PRESSURE: 150 MMHG

## 2020-05-08 VITALS — DIASTOLIC BLOOD PRESSURE: 110 MMHG | SYSTOLIC BLOOD PRESSURE: 170 MMHG

## 2020-05-08 VITALS — HEIGHT: 67.72 IN | BODY MASS INDEX: 27.41 KG/M2 | WEIGHT: 178.79 LBS

## 2020-05-08 DIAGNOSIS — R60.1: ICD-10-CM

## 2020-05-08 DIAGNOSIS — E87.5: ICD-10-CM

## 2020-05-08 DIAGNOSIS — D63.1: ICD-10-CM

## 2020-05-08 DIAGNOSIS — E11.319: ICD-10-CM

## 2020-05-08 DIAGNOSIS — N50.89: ICD-10-CM

## 2020-05-08 DIAGNOSIS — N18.4: ICD-10-CM

## 2020-05-08 DIAGNOSIS — N17.9: ICD-10-CM

## 2020-05-08 DIAGNOSIS — E11.40: ICD-10-CM

## 2020-05-08 DIAGNOSIS — Z79.4: ICD-10-CM

## 2020-05-08 DIAGNOSIS — E87.70: ICD-10-CM

## 2020-05-08 DIAGNOSIS — I08.1: ICD-10-CM

## 2020-05-08 DIAGNOSIS — E78.00: ICD-10-CM

## 2020-05-08 DIAGNOSIS — Z87.891: ICD-10-CM

## 2020-05-08 DIAGNOSIS — E11.610: ICD-10-CM

## 2020-05-08 DIAGNOSIS — E11.21: Primary | ICD-10-CM

## 2020-05-08 DIAGNOSIS — E11.22: ICD-10-CM

## 2020-05-08 DIAGNOSIS — I50.22: ICD-10-CM

## 2020-05-08 DIAGNOSIS — I11.0: ICD-10-CM

## 2020-05-08 DIAGNOSIS — J45.909: ICD-10-CM

## 2020-05-08 DIAGNOSIS — I27.20: ICD-10-CM

## 2020-05-08 DIAGNOSIS — K13.0: ICD-10-CM

## 2020-05-08 DIAGNOSIS — N25.89: ICD-10-CM

## 2020-05-08 DIAGNOSIS — M21.372: ICD-10-CM

## 2020-05-08 LAB
ALBUMIN SERPL-MCNC: 2.8 GM/DL (ref 3.2–4.5)
ALP SERPL-CCNC: 142 U/L (ref 40–136)
ALT SERPL-CCNC: 30 U/L (ref 0–55)
APTT PPP: YELLOW S
BACTERIA #/AREA URNS HPF: (no result) /HPF
BASOPHILS # BLD AUTO: 0.1 10^3/UL (ref 0–0.1)
BASOPHILS NFR BLD AUTO: 1 % (ref 0–10)
BILIRUB SERPL-MCNC: 0.2 MG/DL (ref 0.1–1)
BILIRUB UR QL STRIP: NEGATIVE
BUN/CREAT SERPL: 14
BUN/CREAT SERPL: 15
CALCIUM SERPL-MCNC: 7.9 MG/DL (ref 8.5–10.1)
CALCIUM SERPL-MCNC: 8.1 MG/DL (ref 8.5–10.1)
CHLORIDE SERPL-SCNC: 111 MMOL/L (ref 98–107)
CHLORIDE SERPL-SCNC: 113 MMOL/L (ref 98–107)
CO2 SERPL-SCNC: 16 MMOL/L (ref 21–32)
CO2 SERPL-SCNC: 17 MMOL/L (ref 21–32)
CREAT SERPL-MCNC: 3.99 MG/DL (ref 0.6–1.3)
CREAT SERPL-MCNC: 4.15 MG/DL (ref 0.6–1.3)
EOSINOPHIL # BLD AUTO: 0.7 10^3/UL (ref 0–0.3)
EOSINOPHIL NFR BLD AUTO: 6 % (ref 0–10)
ERYTHROCYTE [DISTWIDTH] IN BLOOD BY AUTOMATED COUNT: 16.3 % (ref 10–14.5)
FIBRINOGEN PPP-MCNC: CLEAR MG/DL
GFR SERPLBLD BASED ON 1.73 SQ M-ARVRAT: 16 ML/MIN
GFR SERPLBLD BASED ON 1.73 SQ M-ARVRAT: 17 ML/MIN
GLUCOSE SERPL-MCNC: 159 MG/DL (ref 70–105)
GLUCOSE SERPL-MCNC: 161 MG/DL (ref 70–105)
GLUCOSE UR STRIP-MCNC: (no result) MG/DL
GRAN CASTS #/AREA URNS LPF: (no result) /LPF
HCT VFR BLD CALC: 28 % (ref 40–54)
HGB BLD-MCNC: 9 G/DL (ref 13.3–17.7)
KETONES UR QL STRIP: NEGATIVE
LEUKOCYTE ESTERASE UR QL STRIP: NEGATIVE
LYMPHOCYTES # BLD AUTO: 2.3 X 10^3 (ref 1–4)
LYMPHOCYTES NFR BLD AUTO: 21 % (ref 12–44)
MANUAL DIFFERENTIAL PERFORMED BLD QL: NO
MCH RBC QN AUTO: 29 PG (ref 25–34)
MCHC RBC AUTO-ENTMCNC: 32 G/DL (ref 32–36)
MCV RBC AUTO: 89 FL (ref 80–99)
MONOCYTES # BLD AUTO: 0.9 X 10^3 (ref 0–1)
MONOCYTES NFR BLD AUTO: 8 % (ref 0–12)
NEUTROPHILS # BLD AUTO: 6.7 X 10^3 (ref 1.8–7.8)
NEUTROPHILS NFR BLD AUTO: 63 % (ref 42–75)
NITRITE UR QL STRIP: NEGATIVE
PH UR STRIP: 6 [PH] (ref 5–9)
PLATELET # BLD: 348 10^3/UL (ref 130–400)
PMV BLD AUTO: 10 FL (ref 7.4–10.4)
POTASSIUM SERPL-SCNC: 5.5 MMOL/L (ref 3.6–5)
POTASSIUM SERPL-SCNC: 6 MMOL/L (ref 3.6–5)
PROT SERPL-MCNC: 6.4 GM/DL (ref 6.4–8.2)
PROT UR QL STRIP: (no result)
SODIUM SERPL-SCNC: 137 MMOL/L (ref 135–145)
SODIUM SERPL-SCNC: 138 MMOL/L (ref 135–145)
SP GR UR STRIP: 1.02 (ref 1.02–1.02)
WBC # BLD AUTO: 10.6 10^3/UL (ref 4.3–11)
WBC #/AREA URNS HPF: (no result) /HPF

## 2020-05-08 PROCEDURE — 87075 CULTR BACTERIA EXCEPT BLOOD: CPT

## 2020-05-08 PROCEDURE — 96374 THER/PROPH/DIAG INJ IV PUSH: CPT

## 2020-05-08 PROCEDURE — 80048 BASIC METABOLIC PNL TOTAL CA: CPT

## 2020-05-08 PROCEDURE — 93308 TTE F-UP OR LMTD: CPT

## 2020-05-08 PROCEDURE — 85027 COMPLETE CBC AUTOMATED: CPT

## 2020-05-08 PROCEDURE — 85025 COMPLETE CBC W/AUTO DIFF WBC: CPT

## 2020-05-08 PROCEDURE — 83880 ASSAY OF NATRIURETIC PEPTIDE: CPT

## 2020-05-08 PROCEDURE — 84100 ASSAY OF PHOSPHORUS: CPT

## 2020-05-08 PROCEDURE — 81000 URINALYSIS NONAUTO W/SCOPE: CPT

## 2020-05-08 PROCEDURE — 71045 X-RAY EXAM CHEST 1 VIEW: CPT

## 2020-05-08 PROCEDURE — 94760 N-INVAS EAR/PLS OXIMETRY 1: CPT

## 2020-05-08 PROCEDURE — 87186 SC STD MICRODIL/AGAR DIL: CPT

## 2020-05-08 PROCEDURE — 87077 CULTURE AEROBIC IDENTIFY: CPT

## 2020-05-08 PROCEDURE — 82010 KETONE BODYS QUAN: CPT

## 2020-05-08 PROCEDURE — 80053 COMPREHEN METABOLIC PANEL: CPT

## 2020-05-08 PROCEDURE — 87070 CULTURE OTHR SPECIMN AEROBIC: CPT

## 2020-05-08 PROCEDURE — 87205 SMEAR GRAM STAIN: CPT

## 2020-05-08 PROCEDURE — 82962 GLUCOSE BLOOD TEST: CPT

## 2020-05-08 PROCEDURE — 83735 ASSAY OF MAGNESIUM: CPT

## 2020-05-08 PROCEDURE — 36415 COLL VENOUS BLD VENIPUNCTURE: CPT

## 2020-05-08 PROCEDURE — 93306 TTE W/DOPPLER COMPLETE: CPT

## 2020-05-08 PROCEDURE — 80061 LIPID PANEL: CPT

## 2020-05-08 PROCEDURE — 93005 ELECTROCARDIOGRAM TRACING: CPT

## 2020-05-08 RX ADMIN — METOPROLOL TARTRATE SCH MG: 50 TABLET, FILM COATED ORAL at 19:34

## 2020-05-08 RX ADMIN — Medication SCH ML: at 21:51

## 2020-05-08 RX ADMIN — FUROSEMIDE SCH MG: 10 INJECTION, SOLUTION INTRAVENOUS at 19:35

## 2020-05-08 RX ADMIN — INSULIN ASPART SCH UNIT: 100 INJECTION, SOLUTION INTRAVENOUS; SUBCUTANEOUS at 21:12

## 2020-05-08 RX ADMIN — HYDRALAZINE HYDROCHLORIDE SCH MG: 25 TABLET ORAL at 19:34

## 2020-05-08 NOTE — NUR
PT TRANSFERRED FROM [facility] TO [facility] VIA  W/ STAFF AND PERSONAL BELONGINGS. PT HAS NO C/O AT 
THIS TIME. WILL CONT TO MONITOR.

## 2020-05-08 NOTE — ED GENERAL
General


Stated Complaint:  ABNORMAL BLOOD WORK


Source of Information:  Patient


Exam Limitations:  No Limitations





History of Present Illness


Date Seen by Provider:  May 8, 2020


Time Seen by Provider:  12:19


Initial Comments


Here with report of being told to come here for abnormal labs. He had labs drawn

2 days ago which showed a blood sugar greater than 600 and potassium greater 

than 6. Does have history of edema, hypertension and diabetes as well as chronic

renal insufficiency. Reports taking his meds as directed. Has been checking his 

blood sugars at home and they've been in the 250s. That concludes a few hours 

ago. Denies nausea, vomiting or chest pain. Is short of breath but is 

chronically short of breath. He is moving back to [place] in a few days. Does 

complain of swelling to his legs and arms and notes that he's had increased 

swelling of his. They arms over the last few days. Denies fever or chills.








Patient further delineated his furosemide use. States that he ran out and then 

was represcribed at half dose. He has had increased swelling since running out 

of his furosemide. He is trying to get back to his normal dose of 40 mg twice a 

day. Has been appointment with his nephrologist on 20 scheduled.


Timing/Duration:  1 Week, Getting Worse


Severity:  Moderate


Associated Systoms:  No Chest Pain, No Cough, No Fever/Chills, No 

Nausea/Vomiting; Shortness of Air, Weakness





Allergies and Home Medications


Allergies


Coded Allergies:  


     Statins-Hmg-Coa Reductase Inhibitor (Unverified  Adverse Reaction, Unknown,

20)


     latex (Unverified  Adverse Reaction, Unknown, 20)





Home Medications


Albuterol Sulfate 1 Puff Puff, 2 PUFF IH Q4H PRN for WEAKNESS


   1 PUFF = 90 MCG 


   Prescribed by: ROWENA JACOME on [id number]


Aspirin 81 Mg Tablet.dr, 81 MG PO DAILY, (Reported)


Furosemide 40 Mg Tablet, 40 MG PO BID, (Reported)


Hydralazine HCl 50 Mg Tablet, 50 MG PO Q8H, (Reported)


Insulin Aspart 100 Unit/1 Ml Susp, 0 SQ UD, (Reported)


Insulin Glargine,Hum.rec.anlog 300 Unit/1 Ml Insuln.pen, 15 UNIT SQ DAILY, 

(Reported)


Metoprolol Tartrate 100 Mg Tablet, 100 MG PO TID, (Reported)


Multivitamin 1 Each Tablet, 1 EACH PO DAILY, (Reported)


Niacinamide 500 Mg Tablet, 500 MG PO DAILY, (Reported)


Omega 3 Polyunsat Fatty Acids 1,000 Mg Cap, 1,000 MG PO DAILY, (Reported)





Patient Home Medication List


Home Medication List Reviewed:  Yes





Review of Systems


Review of Systems


Constitutional:  see HPI


EENTM:  no symptoms reported


Respiratory:  see HPI, short of breath, wheezing


Cardiovascular:  edema; No palpitations


Gastrointestinal:  No abdominal pain, No diarrhea, No nausea, No vomiting


Genitourinary:  no symptoms reported


Musculoskeletal:  No muscle pain; muscle weakness


Skin:  no symptoms reported


Psychiatric/Neurological:  See HPI





All Other Systems Reviewed


Negative Unless Noted:  Yes





Past Medical-Social-Family Hx


Past Med/Social Hx:  Reviewed Nursing Past Med/Soc Hx


Patient Social History


Alcohol Use:  Denies Use


Recreational Drug Use:  No


Smoking Status:  Never a Smoker


2nd Hand Smoke Exposure:  No


Recent Foreign Travel:  No


Contact w/Someone Who Travel:  No


Recent Hopitalizations:  No





Immunizations Up To Date


Tetanus Booster (TDap):  Less than 5yrs





Seasonal Allergies


Seasonal Allergies:  No





Past Medical History


Surgeries:  Yes (LEFT EYE RETINA REPAIR)


Adenoidectomy, Eye Surgery, Tonsillectomy


Respiratory:  No


Cardiac:  Yes


High Cholesterol, Hypertension


Neurological:  No


HIV/AIDS:  No


Genitourinary:  No


Gastrointestinal:  No


Musculoskeletal:  Yes (CHARCOT FOOT)


Endocrine:  Yes


Diabetes, Insulin dep


HEENT:  No


Cancer:  No


Psychosocial:  No


Integumentary:  No


Blood Disorders:  No





Family Medical History


Reviewed Nursing Family Hx





Physical Exam


Vital Signs





Vital Signs - First Documented








 20





 12:14 16:18


 


Temp 37.2 


 


Pulse 80 


 


Resp 18 


 


B/P (MAP) 160/82 (108) 


 


Pulse Ox 100 


 


O2 Delivery  Room Air





Capillary Refill :


Height, Weight, BMI


Height: '"


Weight: lbs. oz. kg; 24.00 BMI


Method:


General Appearance:  No Apparent Distress, WD/WN


HEENT:  Pharynx Normal, Other (mildly unequal pupil size with patient reports 

that has had surgery to his eye. Denies vision problems.)


Neck:  Non Tender, Supple


Respiratory:  Lungs Clear, Normal Breath Sounds


Cardiovascular:  Regular Rate, Rhythm, No Murmur


Gastrointestinal:  Non Tender, Soft


Back:  Normal Inspection, No CVA Tenderness, No Vertebral Tenderness


Extremity:  Normal Range of Motion, Non Tender, Pedal Edema (edema noted in both

upper and lower extremities. Lower extremities edematous all the way up to hips 

with at least 2+. Bilateral upper extremities are edematous from hands to 

forearms.)


Neurologic/Psychiatric:  Alert, Oriented x3


Skin:  Normal Color, Warm/Dry





Progress/Results/Core Measures


Suspected Sepsis


SIRS


Temperature: 


Pulse:  


Respiratory Rate: 


 


Laboratory Tests


20 12:25: White Blood Count 10.6


Blood Pressure  / 


Mean: 


 





Laboratory Tests


20 12:25: 


Creatinine 4.15H, Platelet Count 348, Total Bilirubin 0.2








Results/Orders


Lab Results





Laboratory Tests








Test


 20


12:19 20


12:25 20


12:29 Range/Units


 


 


Urine Color YELLOW     


 


Urine Clarity CLEAR     


 


Urine pH 6.0    5-9  


 


Urine Specific Gravity 1.020    1.016-1.022  


 


Urine Protein 3+ H   NEGATIVE  


 


Urine Glucose (UA) 3+ H   NEGATIVE  


 


Urine Ketones NEGATIVE    NEGATIVE  


 


Urine Nitrite NEGATIVE    NEGATIVE  


 


Urine Bilirubin NEGATIVE    NEGATIVE  


 


Urine Urobilinogen 0.2    < = 1.0  MG/DL


 


Urine Leukocyte Esterase NEGATIVE    NEGATIVE  


 


Urine RBC (Auto) 1+ H   NEGATIVE  


 


Urine RBC 10-25 H    /HPF


 


Urine WBC 0-2     /HPF


 


Urine Crystals NONE     /LPF


 


Urine Bacteria TRACE     /HPF


 


Urine Casts PRESENT     /LPF


 


Urine Granular Casts 5-10 H    /LPF


 


Urine Mucus NEGATIVE     /LPF


 


Urine Culture Indicated NO     


 


White Blood Count


 


 10.6 


 


 4.3-11.0


10^3/uL


 


Red Blood Count


 


 3.14 L


 


 4.35-5.85


10^6/uL


 


Hemoglobin  9.0 L  13.3-17.7  G/DL


 


Hematocrit  28 L  40-54  %


 


Mean Corpuscular Volume  89   80-99  FL


 


Mean Corpuscular Hemoglobin  29   25-34  PG


 


Mean Corpuscular Hemoglobin


Concent 


 32 


 


 32-36  G/DL





 


Red Cell Distribution Width  16.3 H  10.0-14.5  %


 


Platelet Count


 


 348 


 


 130-400


10^3/uL


 


Mean Platelet Volume  10.0   7.4-10.4  FL


 


Neutrophils (%) (Auto)  63   42-75  %


 


Lymphocytes (%) (Auto)  21   12-44  %


 


Monocytes (%) (Auto)  8   0-12  %


 


Eosinophils (%) (Auto)  6   0-10  %


 


Basophils (%) (Auto)  1   0-10  %


 


Neutrophils # (Auto)  6.7   1.8-7.8  X 10^3


 


Lymphocytes # (Auto)  2.3   1.0-4.0  X 10^3


 


Monocytes # (Auto)  0.9   0.0-1.0  X 10^3


 


Eosinophils # (Auto)


 


 0.7 H


 


 0.0-0.3


10^3/uL


 


Basophils # (Auto)


 


 0.1 


 


 0.0-0.1


10^3/uL


 


Sodium Level  137   135-145  MMOL/L


 


Potassium Level  6.0 H  3.6-5.0  MMOL/L


 


Chloride Level  113 H    MMOL/L


 


Carbon Dioxide Level  16 L  21-32  MMOL/L


 


Anion Gap  8   5-14  MMOL/L


 


Blood Urea Nitrogen  60 H  7-18  MG/DL


 


Creatinine


 


 4.15 H


 


 0.60-1.30


MG/DL


 


Estimat Glomerular Filtration


Rate 


 16 


 


  





 


BUN/Creatinine Ratio  14    


 


Glucose Level  159 H    MG/DL


 


Calcium Level  8.1 L  8.5-10.1  MG/DL


 


Corrected Calcium  9.1   8.5-10.1  MG/DL


 


Total Bilirubin  0.2   0.1-1.0  MG/DL


 


Aspartate Amino Transf


(AST/SGOT) 


 23 


 


 5-34  U/L





 


Alanine Aminotransferase


(ALT/SGPT) 


 30 


 


 0-55  U/L





 


Alkaline Phosphatase  142 H    U/L


 


B-Type Natriuretic Peptide  3718.6 H  <100.0  PG/ML


 


Total Protein  6.4   6.4-8.2  GM/DL


 


Albumin  2.8 L  3.2-4.5  GM/DL


 


Beta-Hydroxybutyrate (Chem


panel) 


 0.13 


 


 0.00-0.27


MMOL/L


 


Glucometer   179 H   MG/DL








My Orders





Orders - MAULIK GARVIN MD


Furosemide  Injection (Lasix  Injection) (20 14:05)


Metolazone Tablet (Zaroxolyn Tablet) (20 14:15)


Metolazone Tablet (Zaroxolyn Tablet) (20 14:45)


Furosemide  Injection (Lasix  Injection) (20 14:45)


Metolazone Tablet (Zaroxolyn Tablet) (20 14:45)





Medications Given in ED





Current Medications








 Medications  Dose


 Ordered  Sig/Tiffanie


 Route  Start Time


 Stop Time Status Last Admin


Dose Admin


 


 Metolazone  2.5 mg  ONCE  ONCE


 PO  20 14:15


 20 14:16 DC 20 14:57


10 MG








Vital Signs/I&O











 20





 12:14 15:59 16:18


 


Temp 37.2  36.7


 


Pulse 80 88 83


 


Resp 18 18 18


 


B/P (MAP) 160/82 (108) 160/88 170/110


 


Pulse Ox 100 100 100


 


O2 Delivery   Room Air





Capillary Refill :


Progress Note :  


Progress Note


Seen and evaluated. IV, labs, UA and EKG ordered. Chest x-ray ordered. 

Fingerstick blood sugar. Monitor patient. 1400: Patient noted to have elevated 

creatinine markedly elevated BNP. Chest x-ray noted. I do not believe there is 

concern for pneumonia given patient's laboratory studies. I did discuss the case

with Dr. Parks and she's accept patient for admission but would request 

cardiology input. 1425: On-call cardiologist is Dr. Carty. I did speak with him

regarding the case. He accepts consult regarding heart failure. We have given 

metolazone 2.5 mg by mouth and Lasix 80 mg IV. He is recommending increased dose

of metolazone and Lasix as patient's current dosing hasn't been helpful. 1500: I

did increase metolazone to 10 mg by mouth and we were going to do an additional 

40 mg of Lasix IV. Patient now relates the story that he had ran out of his 

Lasix at 40 mg by mouth twice a day and got a new prescription but that was only

20 mg twice a day. He has had swelling that has been increasing over the last 

month and a half since being out of his Lasix and then being on the half dose. 

Despite the continuation of the half dose, swelling continued. He states when he

is on 40 mg by mouth twice a day that he was able to stay stable. Due to this, I

rediscussed the case with Dr. Parks. We will just do Lasix 80 mg IV now and 

twice a day and see how he does. Patient was informed of all of this and agrees 

with the plan. If we have problems with increasing creatinine or potassium, 

patient may need to be transferred. He follows with Dr. Lluvia Stewart at 

[facility] in [facility] and this would be were he should be transfe

rred to needed. Admit, inpatient status. Patient agrees to plan.





ECG


Initial ECG Impression Date:  May 8, 2020


Initial ECG Impression Time:  12:18


Initial ECG Rate:  91


Initial ECG Rhythm:  Normal Sinus


Comment


Sinus rhythm with normal axis. No evidence of a still elevation MI. Similar to 

previous of 20. Interpreted by me.





Diagnostic Imaging





   Diagonstic Imaging:  Xray


   Plain Films/CT/US/NM/MRI:  chest


Comments


                 [facility] [place], [place]





NAME:   ROBLES OLIVIA


[facility] REC#:   [id number]


ACCOUNT#:   [id number]


PT STATUS:   REG ER


:   1980


PHYSICIAN:   ROWENA JACOME


ADMIT DATE:   20/ER


                                   ***Draft***


Date of Exam:20





CHEST 1 VIEW, AP/PA ONLY








INDICATION: Abnormal blood work, pleural effusion.





COMPARISON: 2020.





TECHNIQUE: Single frontal radiograph of the chest dated May 8,


2020.





FINDINGS: The cardiac silhouette is enlarged, similar to the


prior examination. No significant pulmonary vascular congestion.


Moderate right and small left bibasilar pleural-parenchymal


opacities are present, increased from prior examination. Upper


lungs are clear. No pneumothorax. No acute osseous abnormality.





IMPRESSION: Moderate right and small left bibasilar


pleural-parenchymal opacities, increased in the prior exam. These


are felt related to a combination of pleural fluid with adjacent


atelectasis and/or infiltrate.





Persistent enlargement of the cardiac silhouette without


pulmonary vascular congestion.





  Dictated on workstation # [id number]








Dict:   20 1327


Trans:   20 1333


[facility] [id number]





Interpreted by:     JOSE GUADALUPE BAUTISTA MD


Electronically signed by:





Departure


Communication (Admissions)


Time/Spoke to Admitting Phy:  14:00


Time/Spoke to Consulting Phy:  14:25





Impression





   Primary Impression:  


   Acute on chronic renal failure


   Qualified Codes:  N17.9 - Acute kidney failure, unspecified; N18.9 - Chronic 

   kidney disease, unspecified


   Additional Impression:  


   Volume overload


   Qualified Codes:  E87.70 - Fluid overload, unspecified


Disposition:   ADMITTED AS INPATIENT


Condition:  Stable





Admissions


Decision to Admit Reason:  Admit from ER (General)


Decision to Admit/Date:  May 8, 2020


Time/Decision to Admit Time:  14:00





Departure-Patient Inst.


Referrals:  


[facility] (PCP/Family)


Primary Care Physician











MAULIK GARVIN MD           May 8, 2020 12:34

## 2020-05-08 NOTE — NUR
NOTIFIED DR MENDOZA OF E-ICU CONCERNS FOR PATIENT POSSIBLE NEED FOR TRANSFER TO ANOTHER 
FACILITY. NO NEW ORDERS RECEIVED AT THIS TIME. E-ICU UPDATED.

## 2020-05-08 NOTE — XMS REPORT
Continuity of Care Document

                             Created on: 2020



Jose Mcmullen

External Reference #: [id number]

: 1980

Sex: Male



Demographics





                          Address                   [address] [place]  [address]

 

                          Home Phone                [phone] x

 

                          Preferred Language        Unknown

 

                          Marital Status            Unknown

 

                          Religion Affiliation     Unknown

 

                          Race                      Unknown

 

                          Ethnic Group              Unknown





Author





                          Organization              Unknown

 

                          Address                   Unknown

 

                          Phone                     Unavailable



              



Allergies

      



             Active           Description           Code           Type         

  Severity   

                Reaction           Onset           Reported/Identified          

 

Relationship to Patient                 Clinical Status        

 

                Yes             No Known Drug Allergies           [id number]    

       Drug 

Allergy           Unknown           N/A                             2019  

      

                                                             

 

             Yes           latex           [id number]           Drug Allergy    

       

Unknown           N/A                        2020                         

  

     

 

                    Yes                 Statins-Hmg-Coa Reductase Inhibitor     

      [id number]          

             Drug Allergy           Unknown           N/A                       

 2020   

                                                             



                      



Medications

      



There is no data.                  



Problems

      



             Date Dx Coded           Attending           Type           Code    

       

Diagnosis                               Diagnosed By        

 

             2019           ROWENA JACOME           Ot           E10

.9           

TYPE 1 DIABETES MELLITUS WITHOUT COMPLIC                    

 

             2019           ROWENA JACOME           Ot           J18

.9           

PNEUMONIA, UNSPECIFIED ORGANISM                    

 

             2019           ROWENA JACOME           Ot           R05

           

COUGH                                            

 

             2019           ROWENA JACOME           Ot           Z79

.4           

LONG TERM (CURRENT) USE OF INSULIN                    

 

             2019           ROWENA JACOME           Ot           E10

.9           

TYPE 1 DIABETES MELLITUS WITHOUT COMPLIC                    

 

             2019           ROWENA JACOME           Ot           J18

.9           

PNEUMONIA, UNSPECIFIED ORGANISM                    

 

             2019           ROWENA JACOME           Ot           R05

           

COUGH                                            

 

             2019           ROWENA JACOME           Ot           Z79

.4           

LONG TERM (CURRENT) USE OF INSULIN                    

 

                2020           MAULIK GARVIN MD           Ot        

      E11.9        

                          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              

      

 

                2020           MAULIK GARVIN MD           Ot        

      E78.00       

                          PURE HYPERCHOLESTEROLEMIA, UNSPECIFIED                

    

 

                2020           MAULIK GARVIN MD           Ot        

      I10          

                          ESSENTIAL (PRIMARY) HYPERTENSION                    

 

                2020           MAULIK GARVIN MD           Ot        

      N45.1        

                          EPIDIDYMITIS                       

 

                2020           MAULIK GARVIN MD, Ot        

      N45.3        

                          EPIDIDYMO-ORCHITIS                    

 

                2020           NISHA MACEDO MD           Ot           

   N50.89          

                          OTHER SPECIFIED DISORDERS OF THE MALE GE              

      

 

             2020           ROWENA JACOME           Ot           E11

.9           

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                    

 

                2020           ROWENA JACOME           Ot           

   E78.00          

                          PURE HYPERCHOLESTEROLEMIA, UNSPECIFIED                

    

 

             2020           ROWENA JACOME           Ot           I10

           

ESSENTIAL (PRIMARY) HYPERTENSION                    

 

             2020           ROWENA JACOME           Ot           N04

.9           

NEPHROTIC SYNDROME WITH UNSPECIFIED MORP                    

 

             2020           ROWENA JACOME           Ot           R60

.1           

GENERALIZED EDEMA                                

 

                2020           ROWENA JACOME           Ot           

   Z90.89          

                          ACQUIRED ABSENCE OF OTHER ORGANS                    

 

                2020           NISHA MACEDO MD           Ot           

   N50.89          

                          OTHER SPECIFIED DISORDERS OF THE MALE GE              

      

 

                2020           MAULIK GARVIN MD, Ot        

      E11.9        

                          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              

      

 

                2020           MAULIK GARVIN MD           Ot        

      E78.00       

                          PURE HYPERCHOLESTEROLEMIA, UNSPECIFIED                

    

 

                2020           MAULIK GARVIN MD           Ot        

      I10          

                          ESSENTIAL (PRIMARY) HYPERTENSION                    

 

                2020           MAULIK GARVIN MD           Ot        

      N45.1        

                          EPIDIDYMITIS                       

 

                2020           MAULIK GARVIN MD           Ot        

      N45.3        

                          EPIDIDYMO-ORCHITIS                    

 

             2020           JACOMEROWENA CAROLINA           Ot           E11

.9           

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                    

 

                2020           ROWENA JACOME           Ot           

   E78.00          

                          PURE HYPERCHOLESTEROLEMIA, UNSPECIFIED                

    

 

             2020           JACOMEROWENA CAROLINA           Ot           I10

           

ESSENTIAL (PRIMARY) HYPERTENSION                    

 

             2020           ROWENA JACOME           Ot           N04

.9           

NEPHROTIC SYNDROME WITH UNSPECIFIED MORP                    

 

             2020           JACOMEROWENA CAROLINA           Ot           R60

.1           

GENERALIZED EDEMA                                

 

                2020           ROWENA JACOME           Ot           

   Z90.89          

                          ACQUIRED ABSENCE OF OTHER ORGANS                    

 

                2020           NISHA MACEDO MD           Ot           

   N50.89          

                          OTHER SPECIFIED DISORDERS OF THE MALE GE              

      

 

                2020           NISHA MACEDO MD           Ot           

   N50.89          

                          OTHER SPECIFIED DISORDERS OF THE MALE GE              

      

 

                2020           NISHA MACEDO MD           Ot           

   N50.89          

                          OTHER SPECIFIED DISORDERS OF THE MALE GE              

      

 

                2020           HUANG HOLLY MD           Ot         

     D63.1         

                          ANEMIA IN CHRONIC KIDNEY DISEASE                    

 

                2020           HUANG HOLLY MD           Ot         

     N18.3         

                          CHRONIC KIDNEY DISEASE, STAGE 3 (MODERAT              

      

 

                2020           ELAYNE PINZON, HUANG           Ot         

     D63.1         

                          ANEMIA IN CHRONIC KIDNEY DISEASE                    

 

                2020           ABORUBEN PINZON, HUANG           Ot         

     N18.3         

                          CHRONIC KIDNEY DISEASE, STAGE 3 (MODERAT              

      

 

                2020           ELAYNE PINZON, HUANG           Ot         

     D63.1         

                          ANEMIA IN CHRONIC KIDNEY DISEASE                    

 

                2020           ABORUBEN PINZON, HUANG           Ot         

     N18.3         

                          CHRONIC KIDNEY DISEASE, STAGE 3 (MODERAT              

      

 

                2020           ELAYNE PINZON, HUANG           Ot         

     D63.1         

                          ANEMIA IN CHRONIC KIDNEY DISEASE                    

 

                2020           ABORUBEN PINZON, HUANG           Ot         

     N18.3         

                          CHRONIC KIDNEY DISEASE, STAGE 3 (MODERAT              

      

 

                2020           ELAYNE PINZON, HUANG           Ot         

     D63.1         

                          ANEMIA IN CHRONIC KIDNEY DISEASE                    

 

                2020           ABORUBEN PINZON, HUANG           Ot         

     N18.3         

                          CHRONIC KIDNEY DISEASE, STAGE 3 (MODERAT              

      

 

                2020           ELAYNE PINZON, HUANG           Ot         

     D63.1         

                          ANEMIA IN CHRONIC KIDNEY DISEASE                    

 

                2020           ABORUBEN PINZON, HUANG           Ot         

     N18.3         

                          CHRONIC KIDNEY DISEASE, STAGE 3 (MODERAT              

      

 

                2020           ELAYNE PINZON, HUANG           Ot         

     D63.1         

                          ANEMIA IN CHRONIC KIDNEY DISEASE                    

 

                2020           ELAYNE PINZON, HUANG           Ot         

     N18.3         

                          CHRONIC KIDNEY DISEASE, STAGE 3 (MODERAT              

      



                                                                                
                                 



Procedures

      



There is no data.                  



Results

      



                    Test                Result              Range        

 

                                        Complete blood count (CBC) with automate

d white blood cell (WBC) differential - 

19 17:22         

 

                          Blood leukocytes automated count (number/volume)      

     8.6 10*3/uL          

                                        4.3-11.0        

 

                          Blood erythrocytes automated count (number/volume)    

       3.27 10*6/uL       

                                        4.35-5.85        

 

                    Venous blood hemoglobin measurement (mass/volume)           

9.2 g/dL            

13.3-17.7        

 

                    Blood hematocrit (volume fraction)           28 %           

     40-54        

 

                    Automated erythrocyte mean corpuscular volume           86 [

foz_us]           

80-99        

 

                                        Automated erythrocyte mean corpuscular h

emoglobin (mass per erythrocyte)        

                          28 pg                     25-34        

 

                                        Automated erythrocyte mean corpuscular h

emoglobin concentration measurement 

(mass/volume)             33 g/dL                   32-36        

 

                    Automated erythrocyte distribution width ratio           14.

2 %              10.0-

14.5        

 

                    Automated blood platelet count (count/volume)           265 

10*3/uL           

130-400        

 

                          Automated blood platelet mean volume measurement      

     9.8 [foz_us]         

                                        7.4-10.4        

 

                    Automated blood neutrophils/100 leukocytes           70 %   

             42-75       

 

 

                    Automated blood lymphocytes/100 leukocytes           20 %   

             12-44       

 

 

                    Blood monocytes/100 leukocytes           9 %                

 0-12        

 

                    Automated blood eosinophils/100 leukocytes           1 %    

             0-10        

 

                    Automated blood basophils/100 leukocytes           1 %      

           0-10        

 

                    Blood neutrophils automated count (number/volume)           

6.0 10*3            

1.8-7.8        

 

                    Blood lymphocytes automated count (number/volume)           

1.7 10*3            

1.0-4.0        

 

                    Blood monocytes automated count (number/volume)           0.

7 10*3            

0.0-1.0        

 

                    Automated eosinophil count           0.1 10*3/uL           0

.0-0.3        

 

                    Automated blood basophil count (count/volume)           0.0 

10*3/uL           

0.0-0.1        

 

                                        Comprehensive metabolic panel - 19

 17:22         

 

                          Serum or plasma sodium measurement (moles/volume)     

      137 mmol/L          

                                        135-145        

 

                          Serum or plasma potassium measurement (moles/volume)  

         5.4 mmol/L       

                                        3.6-5.0        

 

                          Serum or plasma chloride measurement (moles/volume)   

        109 mmol/L        

                                                

 

                    Carbon dioxide           18 mmol/L           21-32        

 

                          Serum or plasma anion gap determination (moles/volume)

           10 mmol/L      

                                        5-14        

 

                          Serum or plasma urea nitrogen measurement (mass/volume

)           31 mg/dL      

                                        7-18        

 

                          Serum or plasma creatinine measurement (mass/volume)  

         2.84 mg/dL       

                                        0.60-1.30        

 

                    Serum or plasma urea nitrogen/creatinine mass ratio         

  11                  NRG 

       

 

                                        Serum or plasma creatinine measurement w

ith calculation of estimated glomerular 

filtration rate           25                        NRG        

 

                    Serum or plasma glucose measurement (mass/volume)           

48 mg/dL            

        

 

                    Serum or plasma calcium measurement (mass/volume)           

8.5 mg/dL           

8.5-10.1        

 

                          Serum or plasma total bilirubin measurement (mass/volu

me)           0.4 mg/dL   

                                        0.1-1.0        

 

                                        Serum or plasma alkaline phosphatase emy

surement (enzymatic activity/volume)    

                          86 U/L                            

 

                                        Serum or plasma aspartate aminotransfera

se measurement (enzymatic 

activity/volume)           35 U/L                    5-34        

 

                                        Serum or plasma alanine aminotransferase

 measurement (enzymatic activity/volume)

                          40 U/L                    0-55        

 

                    Serum or plasma protein measurement (mass/volume)           

5.9 g/dL            

6.4-8.2        

 

                    Serum or plasma albumin measurement (mass/volume)           

3.0 g/dL            

3.2-4.5        

 

                    CALCIUM CORRECTED           9.3 mg/dL           8.5-10.1    

    

 

                                        Capillary blood glucose measurement by g

lucometer (mass/volume) - 19 19:34

         

 

                          Capillary blood glucose measurement by glucometer (mas

s/volume)           62 

mg/dL                                           

 

                                        Capillary blood glucose measurement by g

lucometer (mass/volume) - 19 20:07

         

 

                          Capillary blood glucose measurement by glucometer (mas

s/volume)           98 

mg/dL                                           

 

                                        MICROALBUMIN/CREATININE RATIO, URINE - 1

19 15:04         

 

                    CREATININE, RANDOM URINE           74 mg/dL            20-32

0        

 

                    MICROALBUMIN           387.2 mg/dL           See Note:      

  

 

                          MICROALBUMIN/CREATININE RATIO, RANDOM URINE           

5232 mcg/mg creat         

                                        <30        

 

                                        TSH - 19 15:04         

 

                    TSH                 1.91 mIU/L           0.40-4.50        

 

                                        Bacterial urine culture - 20 21:00

         

 

                    Bacterial urine culture           NG                  NRG   

     

 

                                        Chlamydia DNA amp probe, urine - 

0 21:14         

 

                    Chlamydia DNA amp probe, urine           Not Detected       

     Not Detected   

     

 

                                        Urine Neisseria gonorrhoeae DNA assay - 

20 21:14         

 

                    Gonorrhea amp DNA-urine           Not Detected            No

t Detected        

 

                                        Serum reagin antibody assay (units/volum

e) by RPR - 20 21:14         

 

                          Serum reagin antibody assay (units/volume) by [facility]PR     

      Non-Reactive        

                                        Non-Reactive        

 

                                        Complete urinalysis with reflex to cultu

re - 20 14:35         

 

                    Urine color determination           YELLOW              NRG 

       

 

                    Urine clarity determination           CLEAR               NR

G        

 

                    Urine pH measurement by test strip           5.0            

     5-9        

 

                    Specific gravity of urine by test strip           >=        

          1.016-1.022     

   

 

                    Urine protein assay by test strip, semi-quantitative        

   3+                  

NEGATIVE        

 

                    Urine glucose detection by automated test strip           NE

GATIVE            

NEGATIVE        

 

                          Erythrocytes detection in urine sediment by light micr

oscopy           2+       

                                        NEGATIVE        

 

                    Urine ketones detection by automated test strip           NE

GATIVE            

NEGATIVE        

 

                    Urine nitrite detection by test strip           NEGATIVE    

        NEGATIVE    

    

 

                    Urine total bilirubin detection by test strip           NEGA

TIVE            

NEGATIVE        

 

                          Urine urobilinogen measurement by automated test strip

 (mass/volume)           

0.2 mg/dL                               < = 1.0        

 

                    Urine leukocyte esterase detection by dipstick           NEG

ATIVE            

NEGATIVE        

 

                                        Automated urine sediment erythrocyte cou

nt by microscopy (number/high power 

field)                     [HPF]                    NRG        

 

                                        Automated urine sediment leukocyte count

 by microscopy (number/high power field)

                          RARE                      NRG        

 

                          Bacteria detection in urine sediment by light microsco

py           NEGATIVE     

                                        NRG        

 

                                        Squamous epithelial cells detection in u

rine sediment by light microscopy       

                          RARE                      NRG        

 

                          Crystals detection in urine sediment by light microsco

py           NONE         

                                        NRG        

 

                    Casts detection in urine sediment by light microscopy       

    NONE                

NRG        

 

                          Mucus detection in urine sediment by light microscopy 

          NEGATIVE        

                                        NRG        

 

                    Complete urinalysis with reflex to culture           NO     

             NRG        

 

                                        Complete blood count (CBC) with automate

d white blood cell (WBC) differential - 

20 14:39         

 

                          Blood leukocytes automated count (number/volume)      

     9.3 10*3/uL          

                                        4.3-11.0        

 

                          Blood erythrocytes automated count (number/volume)    

       3.49 10*6/uL       

                                        4.35-5.85        

 

                    Venous blood hemoglobin measurement (mass/volume)           

9.7 g/dL            

13.3-17.7        

 

                    Blood hematocrit (volume fraction)           31 %           

     40-54        

 

                    Automated erythrocyte mean corpuscular volume           87 [

foz_us]           

80-99        

 

                                        Automated erythrocyte mean corpuscular h

emoglobin (mass per erythrocyte)        

                          28 pg                     25-34        

 

                                        Automated erythrocyte mean corpuscular h

emoglobin concentration measurement 

(mass/volume)             32 g/dL                   32-36        

 

                    Automated erythrocyte distribution width ratio           16.

7 %              10.0-

14.5        

 

                    Automated blood platelet count (count/volume)           323 

10*3/uL           

130-400        

 

                          Automated blood platelet mean volume measurement      

     10.2 [foz_us]        

                                        7.4-10.4        

 

                    Automated blood neutrophils/100 leukocytes           61 %   

             42-75       

 

 

                    Automated blood lymphocytes/100 leukocytes           22 %   

             12-44       

 

 

                    Blood monocytes/100 leukocytes           8 %                

 0-12        

 

                    Automated blood eosinophils/100 leukocytes           9 %    

             0-10        

 

                    Automated blood basophils/100 leukocytes           1 %      

           0-10        

 

                    Blood neutrophils automated count (number/volume)           

5.7 10*3            

1.8-7.8        

 

                    Blood lymphocytes automated count (number/volume)           

2.1 10*3            

1.0-4.0        

 

                    Blood monocytes automated count (number/volume)           0.

7 10*3            

0.0-1.0        

 

                    Automated eosinophil count           0.8 10*3/uL           0

.0-0.3        

 

                    Automated blood basophil count (count/volume)           0.1 

10*3/uL           

0.0-0.1        

 

                                        PT panel in platelet poor plasma by coag

ulation assay - 20 14:39         

 

                          Prothrombin time (PT) in platelet poor plasma by coagu

lation assay           

16.0 s                                  12.2-14.7        

 

                          INR in platelet poor plasma or blood by coagulation as

say           1.2         

                                        0.8-1.4        

 

                                        Comprehensive metabolic panel - 20

 14:39         

 

                          Serum or plasma sodium measurement (moles/volume)     

      139 mmol/L          

                                        135-145        

 

                          Serum or plasma potassium measurement (moles/volume)  

         5.4 mmol/L       

                                        3.6-5.0        

 

                          Serum or plasma chloride measurement (moles/volume)   

        115 mmol/L        

                                                

 

                    Carbon dioxide           19 mmol/L           21-32        

 

                          Serum or plasma anion gap determination (moles/volume)

           5 mmol/L       

                                        5-14        

 

                          Serum or plasma urea nitrogen measurement (mass/volume

)           51 mg/dL      

                                        7-18        

 

                          Serum or plasma creatinine measurement (mass/volume)  

         3.03 mg/dL       

                                        0.60-1.30        

 

                    Serum or plasma urea nitrogen/creatinine mass ratio         

  17                  NRG 

       

 

                                        Serum or plasma creatinine measurement w

ith calculation of estimated glomerular 

filtration rate           23                        NRG        

 

                    Serum or plasma glucose measurement (mass/volume)           

103 mg/dL           

        

 

                    Serum or plasma calcium measurement (mass/volume)           

8.4 mg/dL           

8.5-10.1        

 

                          Serum or plasma total bilirubin measurement (mass/volu

me)           0.3 mg/dL   

                                        0.1-1.0        

 

                                        Serum or plasma alkaline phosphatase emy

surement (enzymatic activity/volume)    

                          121 U/L                           

 

                                        Serum or plasma aspartate aminotransfera

se measurement (enzymatic 

activity/volume)           32 U/L                    5-34        

 

                                        Serum or plasma alanine aminotransferase

 measurement (enzymatic activity/volume)

                          38 U/L                    0-55        

 

                    Serum or plasma protein measurement (mass/volume)           

5.8 g/dL            

6.4-8.2        

 

                    Serum or plasma albumin measurement (mass/volume)           

3.1 g/dL            

3.2-4.5        

 

                    CALCIUM CORRECTED           9.1 mg/dL           8.5-10.1    

    

 

                                        Serum or plasma lithium measurement (mol

es/volume) - 20 14:39         

 

                    BNP PT              2717.8 pg/mL           <100.0        

 

                                        THYROID STIMULATING HORMONE - 20 1

4:39         

 

                    THYROID STIMULATING HORMONE           1.53 u[iU]/mL         

  0.35-4.94        

 

                                        Serum or plasma thyroxine (T4) free abhi

urement (mass/volume) - 20 14:39  

       

 

                          Serum or plasma thyroxine (T4) free measurement (mass/

volume)           1.00 

ng/dL                                   0.70-1.48        

 

                                        UA W/ MICROSCOPY - 20 10:12       

  

 

                    COLOR               YELLOW              YELLOW        

 

                    APPEARANCE           CLEAR               CLEAR        

 

                    SPECIFIC GRAVITY           1.019               1.001-1.035  

      

 

                    PH                  5.5                 5.0-8.0        

 

                    GLUCOSE             1+                  NEGATIVE        

 

                    BILIRUBIN           NEGATIVE            NEGATIVE        

 

                    KETONES             NEGATIVE            NEGATIVE        

 

                    OCCULT BLOOD           1+                  NEGATIVE        

 

                    PROTEIN             3+                  NEGATIVE        

 

                    NITRITE             NEGATIVE            NEGATIVE        

 

                    LEUKOCYTE ESTERASE           NEGATIVE            NEGATIVE   

     

 

                    WBC                 NONE SEEN /HPF           < OR = 5       

 

 

                    RBC                 3-10 /HPF           < OR = 2        

 

                    SQUAMOUS EPITHELIAL CELLS           NONE SEEN /HPF          

 < OR = 5        

 

                    BACTERIA            NONE SEEN /HPF           NONE SEEN      

  

 

                    HYALINE CAST           10-20 /LPF           NONE SEEN       

 

 

                                        RENAL PROFILE - 20 09:16         

 

                    GLUCOSE             135 mg/dL                   

 

                    UREA NITROGEN (BUN)           49 mg/dL            7-25      

  

 

                    CREATININE           2.80 mg/dL           0.60-1.35        

 

                    eGFR NON-AFR. AMERICAN           28 mL/min/1.73m2           

> OR = 60        

 

                    eGFR            32 mL/min/1.73m2           >

 OR = 60        

 

                    BUN/CREATININE RATIO           18 (calc)           6-22     

   

 

                    SODIUM              141 mmol/L           135-146        

 

                    POTASSIUM           5.3 mmol/L           3.5-5.3        

 

                    CHLORIDE            111 mmol/L                   

 

                    CARBON DIOXIDE           25 mmol/L           20-32        

 

                    CALCIUM             8.3 mg/dL           8.6-10.3        

 

                    ALBUMIN             3.0 g/dL            3.6-5.1        

 

                    PHOSPHATE (AS PHOSPHORUS)           4.5 mg/dL           2.5-

4.5        

 

                                        Complete urinalysis with reflex to cultu

re - 20 12:19         

 

                    Urine color determination           YELLOW              NRG 

       

 

                    Urine clarity determination           CLEAR               NR

G        

 

                    Urine pH measurement by test strip           6.0            

     5-9        

 

                    Specific gravity of urine by test strip           1.020     

          1.016-1.022  

      

 

                    Urine protein assay by test strip, semi-quantitative        

   3+                  

NEGATIVE        

 

                    Urine glucose detection by automated test strip           3+

                  NEGATIVE

        

 

                          Erythrocytes detection in urine sediment by light micr

oscopy           1+       

                                        NEGATIVE        

 

                    Urine ketones detection by automated test strip           NE

GATIVE            

NEGATIVE        

 

                    Urine nitrite detection by test strip           NEGATIVE    

        NEGATIVE    

    

 

                    Urine total bilirubin detection by test strip           NEGA

TIVE            

NEGATIVE        

 

                          Urine urobilinogen measurement by automated test strip

 (mass/volume)           

0.2 mg/dL                               < = 1.0        

 

                    Urine leukocyte esterase detection by dipstick           NEG

ATIVE            

NEGATIVE        

 

                                        Automated urine sediment erythrocyte cou

nt by microscopy (number/high power 

field)                     [HPF]                    NRG        

 

                                        Automated urine sediment leukocyte count

 by microscopy (number/high power field)

                           [HPF]                    NRG        

 

                          Bacteria detection in urine sediment by light microsco

py           TRACE        

                                        NRG        

 

                          Crystals detection in urine sediment by light microsco

py           NONE         

                                        NRG        

 

                          Casts detection in urine sediment by light microscopy 

          PRESENT         

                                        NRG        

 

                          Mucus detection in urine sediment by light microscopy 

          NEGATIVE        

                                        NRG        

 

                    Complete urinalysis with reflex to culture           NO     

             NRG        

 

                          Granular casts detection in urine sediment by light mi

croscopy           5-10   

                                        NRG        

 

                                        Complete blood count (CBC) with automate

d white blood cell (WBC) differential - 

20 12:25         

 

                          Blood leukocytes automated count (number/volume)      

     10.6 10*3/uL         

                                        4.3-11.0        

 

                          Blood erythrocytes automated count (number/volume)    

       3.14 10*6/uL       

                                        4.35-5.85        

 

                    Venous blood hemoglobin measurement (mass/volume)           

9.0 g/dL            

13.3-17.7        

 

                    Blood hematocrit (volume fraction)           28 %           

     40-54        

 

                    Automated erythrocyte mean corpuscular volume           89 [

foz_us]           

80-99        

 

                                        Automated erythrocyte mean corpuscular h

emoglobin (mass per erythrocyte)        

                          29 pg                     25-34        

 

                                        Automated erythrocyte mean corpuscular h

emoglobin concentration measurement 

(mass/volume)             32 g/dL                   32-36        

 

                    Automated erythrocyte distribution width ratio           16.

3 %              10.0-

14.5        

 

                    Automated blood platelet count (count/volume)           348 

10*3/uL           

130-400        

 

                          Automated blood platelet mean volume measurement      

     10.0 [foz_us]        

                                        7.4-10.4        

 

                    Automated blood neutrophils/100 leukocytes           63 %   

             42-75       

 

 

                    Automated blood lymphocytes/100 leukocytes           21 %   

             12-44       

 

 

                    Blood monocytes/100 leukocytes           8 %                

 0-12        

 

                    Automated blood eosinophils/100 leukocytes           6 %    

             0-10        

 

                    Automated blood basophils/100 leukocytes           1 %      

           0-10        

 

                    Blood neutrophils automated count (number/volume)           

6.7 10*3            

1.8-7.8        

 

                    Blood lymphocytes automated count (number/volume)           

2.3 10*3            

1.0-4.0        

 

                    Blood monocytes automated count (number/volume)           0.

9 10*3            

0.0-1.0        

 

                    Automated eosinophil count           0.7 10*3/uL           0

.0-0.3        

 

                    Automated blood basophil count (count/volume)           0.1 

10*3/uL           

0.0-0.1        

 

                                        Comprehensive metabolic panel - 20

 12:25         

 

                          Serum or plasma sodium measurement (moles/volume)     

      137 mmol/L          

                                        135-145        

 

                          Serum or plasma potassium measurement (moles/volume)  

         6.0 mmol/L       

                                        3.6-5.0        

 

                          Serum or plasma chloride measurement (moles/volume)   

        113 mmol/L        

                                                

 

                    Carbon dioxide           16 mmol/L           21-32        

 

                          Serum or plasma anion gap determination (moles/volume)

           8 mmol/L       

                                        5-14        

 

                          Serum or plasma urea nitrogen measurement (mass/volume

)           60 mg/dL      

                                        7-18        

 

                          Serum or plasma creatinine measurement (mass/volume)  

         4.15 mg/dL       

                                        0.60-1.30        

 

                    Serum or plasma urea nitrogen/creatinine mass ratio         

  14                  NRG 

       

 

                                        Serum or plasma creatinine measurement w

ith calculation of estimated glomerular 

filtration rate           16                        NRG        

 

                    Serum or plasma glucose measurement (mass/volume)           

159 mg/dL           

        

 

                    Serum or plasma calcium measurement (mass/volume)           

8.1 mg/dL           

8.5-10.1        

 

                          Serum or plasma total bilirubin measurement (mass/volu

me)           0.2 mg/dL   

                                        0.1-1.0        

 

                                        Serum or plasma alkaline phosphatase emy

surement (enzymatic activity/volume)    

                          142 U/L                           

 

                                        Serum or plasma aspartate aminotransfera

se measurement (enzymatic 

activity/volume)           23 U/L                    5-34        

 

                                        Serum or plasma alanine aminotransferase

 measurement (enzymatic activity/volume)

                          30 U/L                    0-55        

 

                    Serum or plasma protein measurement (mass/volume)           

6.4 g/dL            

6.4-8.2        

 

                    Serum or plasma albumin measurement (mass/volume)           

2.8 g/dL            

3.2-4.5        

 

                    CALCIUM CORRECTED           9.1 mg/dL           8.5-10.1    

    

 

                                        Beta-hydroxybutyric acid measurement - 0

20 12:25         

 

                    Beta-hydroxybutyric acid measurement           0.13 mmol/L  

         0.00-0.27  

      

 

                                        Serum or plasma lithium measurement (mol

es/volume) - 20 12:25         

 

                    BNP PT              3718.6 pg/mL           <100.0        

 

                                        Capillary blood glucose measurement by g

lucometer (mass/volume) - 20 12:29

         

 

                          Capillary blood glucose measurement by glucometer (mas

s/volume)           179 

mg/dL                                           



                                                                



Encounters

      



                ACCT No.           Visit Date/Time           Discharge          

 Status         

             Pt. Type           Provider           Facility           Loc./Unit 

          

Complaint        

 

                [id number]           2020 09:00:00           2020 23:59:

59           CLS

                Outpatient           NISHA MACEDO MD                        

   [facility]                                  

 

             [id number]           2020 08:40:00                              

       Document

 Registration                                                                   

 

 

             [id number]           2020 09:00:00                              

       Document

 Registration                                                                   

 

 

             [id number]           2019 13:20:00                              

       Document

 Registration                                                                   

 

 

                    [id number]           2020 20:15:00            20:33:00        

                DIS             Emergency           ROWENA JACOME APRN         

  [facility]           ER                        FALL/ HEAD INJURY      

  

 

                    [id number]           2020 08:28:00            23:59:59        

                CLS             Outpatient           HUANG HOLLY MD      

     [facility]           SDC                       IRON DEF ANEMIA

        

 

                    [id number]           2020 13:58:00            17:24:00        

                DIS             Emergency           ROWENA JACOME         

  [facility]           ER                        SWOLLEN TESTICLES      

  

 

                    [id number]           2020 10:07:00            23:59:59        

                CLS             Outpatient           NISHA MACEDO MD        

   [facility]           RAD                       BILATERAL TESTICULAR SW

[place]      

  

 

                    [id number]           2020 20:04:00            22:04:00        

                DIS             Emergency           BHARATHI PINZON, MAULIK CALDERON      

     [facility] 

[facility]           ER                        SWOLLEN TESTICL

ES        

 

                    [id number]           2019 16:00:00           

019 20:21:00        

                DIS             Emergency           ROWENA JACOME         

  [facility]           ER                        COUGH / CONGESTION / BA

CK PAIN      

  

 

             [id number]           2020 12:11:00                        A

CT           

Emergency                 BHARATHI PINZON, MAULIK CALDERON           [facility]                 ER                        ABNORMAL BLOOD WORK

## 2020-05-08 NOTE — XMS REPORT
Continuity of Care Document

                             Created on: 2020



Jose Mcmullen

External Reference #: [id number]

: 1980

Sex: Male



Demographics





                          Address                   [address] [place]  [address]

 

                          Home Phone                [phone] x

 

                          Preferred Language        Unknown

 

                          Marital Status            Unknown

 

                          Hinduism Affiliation     Unknown

 

                          Race                      Unknown

 

                          Ethnic Group              Unknown





Author





                          Organization              Unknown

 

                          Address                   Unknown

 

                          Phone                     Unavailable



              



Allergies

      



             Active           Description           Code           Type         

  Severity   

                Reaction           Onset           Reported/Identified          

 

Relationship to Patient                 Clinical Status        

 

                Yes             No Known Drug Allergies           [id number]    

       Drug 

Allergy           Unknown           N/A                             2019  

      

                                                             

 

             Yes           latex           [id number]           Drug Allergy    

       

Unknown           N/A                        2020                         

  

     

 

                    Yes                 Statins-Hmg-Coa Reductase Inhibitor     

      [id number]          

             Drug Allergy           Unknown           N/A                       

 2020   

                                                             



                      



Medications

      



There is no data.                  



Problems

      



             Date Dx Coded           Attending           Type           Code    

       

Diagnosis                               Diagnosed By        

 

             2019           ROWENA JACOME           Ot           E10

.9           

TYPE 1 DIABETES MELLITUS WITHOUT COMPLIC                    

 

             2019           ROWENA JACOME           Ot           J18

.9           

PNEUMONIA, UNSPECIFIED ORGANISM                    

 

             2019           ROWENA JACOME           Ot           R05

           

COUGH                                            

 

             2019           ROWENA JACOME           Ot           Z79

.4           

LONG TERM (CURRENT) USE OF INSULIN                    

 

             2019           ROWENA JACOME           Ot           E10

.9           

TYPE 1 DIABETES MELLITUS WITHOUT COMPLIC                    

 

             2019           ROWENA JACOME           Ot           J18

.9           

PNEUMONIA, UNSPECIFIED ORGANISM                    

 

             2019           ROWENA JACOME           Ot           R05

           

COUGH                                            

 

             2019           ROWENA JACOME           Ot           Z79

.4           

LONG TERM (CURRENT) USE OF INSULIN                    

 

                2020           MAULIK GARVIN MD           Ot        

      E11.9        

                          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              

      

 

                2020           MAULIK GARVIN MD           Ot        

      E78.00       

                          PURE HYPERCHOLESTEROLEMIA, UNSPECIFIED                

    

 

                2020           MAULIK GARVIN MD           Ot        

      I10          

                          ESSENTIAL (PRIMARY) HYPERTENSION                    

 

                2020           MAULIK GARVIN MD           Ot        

      N45.1        

                          EPIDIDYMITIS                       

 

                2020           MAULIK GARVIN MD, Ot        

      N45.3        

                          EPIDIDYMO-ORCHITIS                    

 

                2020           NISHA MACEDO MD           Ot           

   N50.89          

                          OTHER SPECIFIED DISORDERS OF THE MALE GE              

      

 

             2020           ROWENA JACOME           Ot           E11

.9           

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                    

 

                2020           ROWENA JACOME           Ot           

   E78.00          

                          PURE HYPERCHOLESTEROLEMIA, UNSPECIFIED                

    

 

             2020           ROWENA JACOME           Ot           I10

           

ESSENTIAL (PRIMARY) HYPERTENSION                    

 

             2020           ROWENA JACOME           Ot           N04

.9           

NEPHROTIC SYNDROME WITH UNSPECIFIED MORP                    

 

             2020           ROWENA JACOME           Ot           R60

.1           

GENERALIZED EDEMA                                

 

                2020           ROWENA JACOME           Ot           

   Z90.89          

                          ACQUIRED ABSENCE OF OTHER ORGANS                    

 

                2020           NISHA MACEDO MD           Ot           

   N50.89          

                          OTHER SPECIFIED DISORDERS OF THE MALE GE              

      

 

                2020           MAULIK GARVIN MD, Ot        

      E11.9        

                          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              

      

 

                2020           MAULIK GARVIN MD           Ot        

      E78.00       

                          PURE HYPERCHOLESTEROLEMIA, UNSPECIFIED                

    

 

                2020           MAULIK GARVIN MD           Ot        

      I10          

                          ESSENTIAL (PRIMARY) HYPERTENSION                    

 

                2020           MAULIK GARVIN MD           Ot        

      N45.1        

                          EPIDIDYMITIS                       

 

                2020           MAULIK GARVIN MD           Ot        

      N45.3        

                          EPIDIDYMO-ORCHITIS                    

 

             2020           JACOMEROWENA CAROLINA           Ot           E11

.9           

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                    

 

                2020           ROWENA JACOME           Ot           

   E78.00          

                          PURE HYPERCHOLESTEROLEMIA, UNSPECIFIED                

    

 

             2020           JACOMEROWENA CAROLINA           Ot           I10

           

ESSENTIAL (PRIMARY) HYPERTENSION                    

 

             2020           ROWENA JACOME           Ot           N04

.9           

NEPHROTIC SYNDROME WITH UNSPECIFIED MORP                    

 

             2020           JACOMEROWENA CAROLINA           Ot           R60

.1           

GENERALIZED EDEMA                                

 

                2020           ROWENA JACOME           Ot           

   Z90.89          

                          ACQUIRED ABSENCE OF OTHER ORGANS                    

 

                2020           NISHA MACEDO MD           Ot           

   N50.89          

                          OTHER SPECIFIED DISORDERS OF THE MALE GE              

      

 

                2020           NISHA MACEDO MD           Ot           

   N50.89          

                          OTHER SPECIFIED DISORDERS OF THE MALE GE              

      

 

                2020           NISHA MACEDO MD           Ot           

   N50.89          

                          OTHER SPECIFIED DISORDERS OF THE MALE GE              

      

 

                2020           HUANG HOLLY MD           Ot         

     D63.1         

                          ANEMIA IN CHRONIC KIDNEY DISEASE                    

 

                2020           HUANG HOLLY MD           Ot         

     N18.3         

                          CHRONIC KIDNEY DISEASE, STAGE 3 (MODERAT              

      

 

                2020           ELAYNE PINZON, HUANG           Ot         

     D63.1         

                          ANEMIA IN CHRONIC KIDNEY DISEASE                    

 

                2020           ABORUBEN PINZON, HUANG           Ot         

     N18.3         

                          CHRONIC KIDNEY DISEASE, STAGE 3 (MODERAT              

      

 

                2020           ELAYNE PINZON, HUANG           Ot         

     D63.1         

                          ANEMIA IN CHRONIC KIDNEY DISEASE                    

 

                2020           ABORUBEN PINZON, HUANG           Ot         

     N18.3         

                          CHRONIC KIDNEY DISEASE, STAGE 3 (MODERAT              

      

 

                2020           ELAYNE PINZON, HUANG           Ot         

     D63.1         

                          ANEMIA IN CHRONIC KIDNEY DISEASE                    

 

                2020           ABORUBEN PINZON, HUANG           Ot         

     N18.3         

                          CHRONIC KIDNEY DISEASE, STAGE 3 (MODERAT              

      

 

                2020           ELAYNE PINZON, HUANG           Ot         

     D63.1         

                          ANEMIA IN CHRONIC KIDNEY DISEASE                    

 

                2020           ABORUBEN PINZON, HUANG           Ot         

     N18.3         

                          CHRONIC KIDNEY DISEASE, STAGE 3 (MODERAT              

      

 

                2020           ELAYNE PINZON, HUANG           Ot         

     D63.1         

                          ANEMIA IN CHRONIC KIDNEY DISEASE                    

 

                2020           ABORUBEN PINZON, HUANG           Ot         

     N18.3         

                          CHRONIC KIDNEY DISEASE, STAGE 3 (MODERAT              

      

 

                2020           ELAYNE PINZON, HUANG           Ot         

     D63.1         

                          ANEMIA IN CHRONIC KIDNEY DISEASE                    

 

                2020           ELAYNE PINZON, HUANG           Ot         

     N18.3         

                          CHRONIC KIDNEY DISEASE, STAGE 3 (MODERAT              

      



                                                                                
                                 



Procedures

      



There is no data.                  



Results

      



                    Test                Result              Range        

 

                                        Complete blood count (CBC) with automate

d white blood cell (WBC) differential - 

19 17:22         

 

                          Blood leukocytes automated count (number/volume)      

     8.6 10*3/uL          

                                        4.3-11.0        

 

                          Blood erythrocytes automated count (number/volume)    

       3.27 10*6/uL       

                                        4.35-5.85        

 

                    Venous blood hemoglobin measurement (mass/volume)           

9.2 g/dL            

13.3-17.7        

 

                    Blood hematocrit (volume fraction)           28 %           

     40-54        

 

                    Automated erythrocyte mean corpuscular volume           86 [

foz_us]           

80-99        

 

                                        Automated erythrocyte mean corpuscular h

emoglobin (mass per erythrocyte)        

                          28 pg                     25-34        

 

                                        Automated erythrocyte mean corpuscular h

emoglobin concentration measurement 

(mass/volume)             33 g/dL                   32-36        

 

                    Automated erythrocyte distribution width ratio           14.

2 %              10.0-

14.5        

 

                    Automated blood platelet count (count/volume)           265 

10*3/uL           

130-400        

 

                          Automated blood platelet mean volume measurement      

     9.8 [foz_us]         

                                        7.4-10.4        

 

                    Automated blood neutrophils/100 leukocytes           70 %   

             42-75       

 

 

                    Automated blood lymphocytes/100 leukocytes           20 %   

             12-44       

 

 

                    Blood monocytes/100 leukocytes           9 %                

 0-12        

 

                    Automated blood eosinophils/100 leukocytes           1 %    

             0-10        

 

                    Automated blood basophils/100 leukocytes           1 %      

           0-10        

 

                    Blood neutrophils automated count (number/volume)           

6.0 10*3            

1.8-7.8        

 

                    Blood lymphocytes automated count (number/volume)           

1.7 10*3            

1.0-4.0        

 

                    Blood monocytes automated count (number/volume)           0.

7 10*3            

0.0-1.0        

 

                    Automated eosinophil count           0.1 10*3/uL           0

.0-0.3        

 

                    Automated blood basophil count (count/volume)           0.0 

10*3/uL           

0.0-0.1        

 

                                        Comprehensive metabolic panel - 19

 17:22         

 

                          Serum or plasma sodium measurement (moles/volume)     

      137 mmol/L          

                                        135-145        

 

                          Serum or plasma potassium measurement (moles/volume)  

         5.4 mmol/L       

                                        3.6-5.0        

 

                          Serum or plasma chloride measurement (moles/volume)   

        109 mmol/L        

                                                

 

                    Carbon dioxide           18 mmol/L           21-32        

 

                          Serum or plasma anion gap determination (moles/volume)

           10 mmol/L      

                                        5-14        

 

                          Serum or plasma urea nitrogen measurement (mass/volume

)           31 mg/dL      

                                        7-18        

 

                          Serum or plasma creatinine measurement (mass/volume)  

         2.84 mg/dL       

                                        0.60-1.30        

 

                    Serum or plasma urea nitrogen/creatinine mass ratio         

  11                  NRG 

       

 

                                        Serum or plasma creatinine measurement w

ith calculation of estimated glomerular 

filtration rate           25                        NRG        

 

                    Serum or plasma glucose measurement (mass/volume)           

48 mg/dL            

        

 

                    Serum or plasma calcium measurement (mass/volume)           

8.5 mg/dL           

8.5-10.1        

 

                          Serum or plasma total bilirubin measurement (mass/volu

me)           0.4 mg/dL   

                                        0.1-1.0        

 

                                        Serum or plasma alkaline phosphatase emy

surement (enzymatic activity/volume)    

                          86 U/L                            

 

                                        Serum or plasma aspartate aminotransfera

se measurement (enzymatic 

activity/volume)           35 U/L                    5-34        

 

                                        Serum or plasma alanine aminotransferase

 measurement (enzymatic activity/volume)

                          40 U/L                    0-55        

 

                    Serum or plasma protein measurement (mass/volume)           

5.9 g/dL            

6.4-8.2        

 

                    Serum or plasma albumin measurement (mass/volume)           

3.0 g/dL            

3.2-4.5        

 

                    CALCIUM CORRECTED           9.3 mg/dL           8.5-10.1    

    

 

                                        Capillary blood glucose measurement by g

lucometer (mass/volume) - 19 19:34

         

 

                          Capillary blood glucose measurement by glucometer (mas

s/volume)           62 

mg/dL                                           

 

                                        Capillary blood glucose measurement by g

lucometer (mass/volume) - 19 20:07

         

 

                          Capillary blood glucose measurement by glucometer (mas

s/volume)           98 

mg/dL                                           

 

                                        MICROALBUMIN/CREATININE RATIO, URINE - 1

19 15:04         

 

                    CREATININE, RANDOM URINE           74 mg/dL            20-32

0        

 

                    MICROALBUMIN           387.2 mg/dL           See Note:      

  

 

                          MICROALBUMIN/CREATININE RATIO, RANDOM URINE           

5232 mcg/mg creat         

                                        <30        

 

                                        TSH - 19 15:04         

 

                    TSH                 1.91 mIU/L           0.40-4.50        

 

                                        Bacterial urine culture - 20 21:00

         

 

                    Bacterial urine culture           NG                  NRG   

     

 

                                        Chlamydia DNA amp probe, urine - 

0 21:14         

 

                    Chlamydia DNA amp probe, urine           Not Detected       

     Not Detected   

     

 

                                        Urine Neisseria gonorrhoeae DNA assay - 

20 21:14         

 

                    Gonorrhea amp DNA-urine           Not Detected            No

t Detected        

 

                                        Serum reagin antibody assay (units/volum

e) by RPR - 20 21:14         

 

                          Serum reagin antibody assay (units/volume) by [facility]PR     

      Non-Reactive        

                                        Non-Reactive        

 

                                        Complete urinalysis with reflex to cultu

re - 20 14:35         

 

                    Urine color determination           YELLOW              NRG 

       

 

                    Urine clarity determination           CLEAR               NR

G        

 

                    Urine pH measurement by test strip           5.0            

     5-9        

 

                    Specific gravity of urine by test strip           >=        

          1.016-1.022     

   

 

                    Urine protein assay by test strip, semi-quantitative        

   3+                  

NEGATIVE        

 

                    Urine glucose detection by automated test strip           NE

GATIVE            

NEGATIVE        

 

                          Erythrocytes detection in urine sediment by light micr

oscopy           2+       

                                        NEGATIVE        

 

                    Urine ketones detection by automated test strip           NE

GATIVE            

NEGATIVE        

 

                    Urine nitrite detection by test strip           NEGATIVE    

        NEGATIVE    

    

 

                    Urine total bilirubin detection by test strip           NEGA

TIVE            

NEGATIVE        

 

                          Urine urobilinogen measurement by automated test strip

 (mass/volume)           

0.2 mg/dL                               < = 1.0        

 

                    Urine leukocyte esterase detection by dipstick           NEG

ATIVE            

NEGATIVE        

 

                                        Automated urine sediment erythrocyte cou

nt by microscopy (number/high power 

field)                     [HPF]                    NRG        

 

                                        Automated urine sediment leukocyte count

 by microscopy (number/high power field)

                          RARE                      NRG        

 

                          Bacteria detection in urine sediment by light microsco

py           NEGATIVE     

                                        NRG        

 

                                        Squamous epithelial cells detection in u

rine sediment by light microscopy       

                          RARE                      NRG        

 

                          Crystals detection in urine sediment by light microsco

py           NONE         

                                        NRG        

 

                    Casts detection in urine sediment by light microscopy       

    NONE                

NRG        

 

                          Mucus detection in urine sediment by light microscopy 

          NEGATIVE        

                                        NRG        

 

                    Complete urinalysis with reflex to culture           NO     

             NRG        

 

                                        Complete blood count (CBC) with automate

d white blood cell (WBC) differential - 

20 14:39         

 

                          Blood leukocytes automated count (number/volume)      

     9.3 10*3/uL          

                                        4.3-11.0        

 

                          Blood erythrocytes automated count (number/volume)    

       3.49 10*6/uL       

                                        4.35-5.85        

 

                    Venous blood hemoglobin measurement (mass/volume)           

9.7 g/dL            

13.3-17.7        

 

                    Blood hematocrit (volume fraction)           31 %           

     40-54        

 

                    Automated erythrocyte mean corpuscular volume           87 [

foz_us]           

80-99        

 

                                        Automated erythrocyte mean corpuscular h

emoglobin (mass per erythrocyte)        

                          28 pg                     25-34        

 

                                        Automated erythrocyte mean corpuscular h

emoglobin concentration measurement 

(mass/volume)             32 g/dL                   32-36        

 

                    Automated erythrocyte distribution width ratio           16.

7 %              10.0-

14.5        

 

                    Automated blood platelet count (count/volume)           323 

10*3/uL           

130-400        

 

                          Automated blood platelet mean volume measurement      

     10.2 [foz_us]        

                                        7.4-10.4        

 

                    Automated blood neutrophils/100 leukocytes           61 %   

             42-75       

 

 

                    Automated blood lymphocytes/100 leukocytes           22 %   

             12-44       

 

 

                    Blood monocytes/100 leukocytes           8 %                

 0-12        

 

                    Automated blood eosinophils/100 leukocytes           9 %    

             0-10        

 

                    Automated blood basophils/100 leukocytes           1 %      

           0-10        

 

                    Blood neutrophils automated count (number/volume)           

5.7 10*3            

1.8-7.8        

 

                    Blood lymphocytes automated count (number/volume)           

2.1 10*3            

1.0-4.0        

 

                    Blood monocytes automated count (number/volume)           0.

7 10*3            

0.0-1.0        

 

                    Automated eosinophil count           0.8 10*3/uL           0

.0-0.3        

 

                    Automated blood basophil count (count/volume)           0.1 

10*3/uL           

0.0-0.1        

 

                                        PT panel in platelet poor plasma by coag

ulation assay - 20 14:39         

 

                          Prothrombin time (PT) in platelet poor plasma by coagu

lation assay           

16.0 s                                  12.2-14.7        

 

                          INR in platelet poor plasma or blood by coagulation as

say           1.2         

                                        0.8-1.4        

 

                                        Comprehensive metabolic panel - 20

 14:39         

 

                          Serum or plasma sodium measurement (moles/volume)     

      139 mmol/L          

                                        135-145        

 

                          Serum or plasma potassium measurement (moles/volume)  

         5.4 mmol/L       

                                        3.6-5.0        

 

                          Serum or plasma chloride measurement (moles/volume)   

        115 mmol/L        

                                                

 

                    Carbon dioxide           19 mmol/L           21-32        

 

                          Serum or plasma anion gap determination (moles/volume)

           5 mmol/L       

                                        5-14        

 

                          Serum or plasma urea nitrogen measurement (mass/volume

)           51 mg/dL      

                                        7-18        

 

                          Serum or plasma creatinine measurement (mass/volume)  

         3.03 mg/dL       

                                        0.60-1.30        

 

                    Serum or plasma urea nitrogen/creatinine mass ratio         

  17                  NRG 

       

 

                                        Serum or plasma creatinine measurement w

ith calculation of estimated glomerular 

filtration rate           23                        NRG        

 

                    Serum or plasma glucose measurement (mass/volume)           

103 mg/dL           

        

 

                    Serum or plasma calcium measurement (mass/volume)           

8.4 mg/dL           

8.5-10.1        

 

                          Serum or plasma total bilirubin measurement (mass/volu

me)           0.3 mg/dL   

                                        0.1-1.0        

 

                                        Serum or plasma alkaline phosphatase emy

surement (enzymatic activity/volume)    

                          121 U/L                           

 

                                        Serum or plasma aspartate aminotransfera

se measurement (enzymatic 

activity/volume)           32 U/L                    5-34        

 

                                        Serum or plasma alanine aminotransferase

 measurement (enzymatic activity/volume)

                          38 U/L                    0-55        

 

                    Serum or plasma protein measurement (mass/volume)           

5.8 g/dL            

6.4-8.2        

 

                    Serum or plasma albumin measurement (mass/volume)           

3.1 g/dL            

3.2-4.5        

 

                    CALCIUM CORRECTED           9.1 mg/dL           8.5-10.1    

    

 

                                        Serum or plasma lithium measurement (mol

es/volume) - 20 14:39         

 

                    BNP PT              2717.8 pg/mL           <100.0        

 

                                        THYROID STIMULATING HORMONE - 20 1

4:39         

 

                    THYROID STIMULATING HORMONE           1.53 u[iU]/mL         

  0.35-4.94        

 

                                        Serum or plasma thyroxine (T4) free abhi

urement (mass/volume) - 20 14:39  

       

 

                          Serum or plasma thyroxine (T4) free measurement (mass/

volume)           1.00 

ng/dL                                   0.70-1.48        

 

                                        UA W/ MICROSCOPY - 20 10:12       

  

 

                    COLOR               YELLOW              YELLOW        

 

                    APPEARANCE           CLEAR               CLEAR        

 

                    SPECIFIC GRAVITY           1.019               1.001-1.035  

      

 

                    PH                  5.5                 5.0-8.0        

 

                    GLUCOSE             1+                  NEGATIVE        

 

                    BILIRUBIN           NEGATIVE            NEGATIVE        

 

                    KETONES             NEGATIVE            NEGATIVE        

 

                    OCCULT BLOOD           1+                  NEGATIVE        

 

                    PROTEIN             3+                  NEGATIVE        

 

                    NITRITE             NEGATIVE            NEGATIVE        

 

                    LEUKOCYTE ESTERASE           NEGATIVE            NEGATIVE   

     

 

                    WBC                 NONE SEEN /HPF           < OR = 5       

 

 

                    RBC                 3-10 /HPF           < OR = 2        

 

                    SQUAMOUS EPITHELIAL CELLS           NONE SEEN /HPF          

 < OR = 5        

 

                    BACTERIA            NONE SEEN /HPF           NONE SEEN      

  

 

                    HYALINE CAST           10-20 /LPF           NONE SEEN       

 

 

                                        RENAL PROFILE - 20 09:16         

 

                    GLUCOSE             135 mg/dL                   

 

                    UREA NITROGEN (BUN)           49 mg/dL            7-25      

  

 

                    CREATININE           2.80 mg/dL           0.60-1.35        

 

                    eGFR NON-AFR. AMERICAN           28 mL/min/1.73m2           

> OR = 60        

 

                    eGFR            32 mL/min/1.73m2           >

 OR = 60        

 

                    BUN/CREATININE RATIO           18 (calc)           6-22     

   

 

                    SODIUM              141 mmol/L           135-146        

 

                    POTASSIUM           5.3 mmol/L           3.5-5.3        

 

                    CHLORIDE            111 mmol/L                   

 

                    CARBON DIOXIDE           25 mmol/L           20-32        

 

                    CALCIUM             8.3 mg/dL           8.6-10.3        

 

                    ALBUMIN             3.0 g/dL            3.6-5.1        

 

                    PHOSPHATE (AS PHOSPHORUS)           4.5 mg/dL           2.5-

4.5        

 

                                        Complete urinalysis with reflex to cultu

re - 20 12:19         

 

                    Urine color determination           YELLOW              NRG 

       

 

                    Urine clarity determination           CLEAR               NR

G        

 

                    Urine pH measurement by test strip           6.0            

     5-9        

 

                    Specific gravity of urine by test strip           1.020     

          1.016-1.022  

      

 

                    Urine protein assay by test strip, semi-quantitative        

   3+                  

NEGATIVE        

 

                    Urine glucose detection by automated test strip           3+

                  NEGATIVE

        

 

                          Erythrocytes detection in urine sediment by light micr

oscopy           1+       

                                        NEGATIVE        

 

                    Urine ketones detection by automated test strip           NE

GATIVE            

NEGATIVE        

 

                    Urine nitrite detection by test strip           NEGATIVE    

        NEGATIVE    

    

 

                    Urine total bilirubin detection by test strip           NEGA

TIVE            

NEGATIVE        

 

                          Urine urobilinogen measurement by automated test strip

 (mass/volume)           

0.2 mg/dL                               < = 1.0        

 

                    Urine leukocyte esterase detection by dipstick           NEG

ATIVE            

NEGATIVE        

 

                                        Automated urine sediment erythrocyte cou

nt by microscopy (number/high power 

field)                     [HPF]                    NRG        

 

                                        Automated urine sediment leukocyte count

 by microscopy (number/high power field)

                           [HPF]                    NRG        

 

                          Bacteria detection in urine sediment by light microsco

py           TRACE        

                                        NRG        

 

                          Crystals detection in urine sediment by light microsco

py           NONE         

                                        NRG        

 

                          Casts detection in urine sediment by light microscopy 

          PRESENT         

                                        NRG        

 

                          Mucus detection in urine sediment by light microscopy 

          NEGATIVE        

                                        NRG        

 

                    Complete urinalysis with reflex to culture           NO     

             NRG        

 

                          Granular casts detection in urine sediment by light mi

croscopy           5-10   

                                        NRG        

 

                                        Complete blood count (CBC) with automate

d white blood cell (WBC) differential - 

20 12:25         

 

                          Blood leukocytes automated count (number/volume)      

     10.6 10*3/uL         

                                        4.3-11.0        

 

                          Blood erythrocytes automated count (number/volume)    

       3.14 10*6/uL       

                                        4.35-5.85        

 

                    Venous blood hemoglobin measurement (mass/volume)           

9.0 g/dL            

13.3-17.7        

 

                    Blood hematocrit (volume fraction)           28 %           

     40-54        

 

                    Automated erythrocyte mean corpuscular volume           89 [

foz_us]           

80-99        

 

                                        Automated erythrocyte mean corpuscular h

emoglobin (mass per erythrocyte)        

                          29 pg                     25-34        

 

                                        Automated erythrocyte mean corpuscular h

emoglobin concentration measurement 

(mass/volume)             32 g/dL                   32-36        

 

                    Automated erythrocyte distribution width ratio           16.

3 %              10.0-

14.5        

 

                    Automated blood platelet count (count/volume)           348 

10*3/uL           

130-400        

 

                          Automated blood platelet mean volume measurement      

     10.0 [foz_us]        

                                        7.4-10.4        

 

                    Automated blood neutrophils/100 leukocytes           63 %   

             42-75       

 

 

                    Automated blood lymphocytes/100 leukocytes           21 %   

             12-44       

 

 

                    Blood monocytes/100 leukocytes           8 %                

 0-12        

 

                    Automated blood eosinophils/100 leukocytes           6 %    

             0-10        

 

                    Automated blood basophils/100 leukocytes           1 %      

           0-10        

 

                    Blood neutrophils automated count (number/volume)           

6.7 10*3            

1.8-7.8        

 

                    Blood lymphocytes automated count (number/volume)           

2.3 10*3            

1.0-4.0        

 

                    Blood monocytes automated count (number/volume)           0.

9 10*3            

0.0-1.0        

 

                    Automated eosinophil count           0.7 10*3/uL           0

.0-0.3        

 

                    Automated blood basophil count (count/volume)           0.1 

10*3/uL           

0.0-0.1        

 

                                        Comprehensive metabolic panel - 20

 12:25         

 

                          Serum or plasma sodium measurement (moles/volume)     

      137 mmol/L          

                                        135-145        

 

                          Serum or plasma potassium measurement (moles/volume)  

         6.0 mmol/L       

                                        3.6-5.0        

 

                          Serum or plasma chloride measurement (moles/volume)   

        113 mmol/L        

                                                

 

                    Carbon dioxide           16 mmol/L           21-32        

 

                          Serum or plasma anion gap determination (moles/volume)

           8 mmol/L       

                                        5-14        

 

                          Serum or plasma urea nitrogen measurement (mass/volume

)           60 mg/dL      

                                        7-18        

 

                          Serum or plasma creatinine measurement (mass/volume)  

         4.15 mg/dL       

                                        0.60-1.30        

 

                    Serum or plasma urea nitrogen/creatinine mass ratio         

  14                  NRG 

       

 

                                        Serum or plasma creatinine measurement w

ith calculation of estimated glomerular 

filtration rate           16                        NRG        

 

                    Serum or plasma glucose measurement (mass/volume)           

159 mg/dL           

        

 

                    Serum or plasma calcium measurement (mass/volume)           

8.1 mg/dL           

8.5-10.1        

 

                          Serum or plasma total bilirubin measurement (mass/volu

me)           0.2 mg/dL   

                                        0.1-1.0        

 

                                        Serum or plasma alkaline phosphatase emy

surement (enzymatic activity/volume)    

                          142 U/L                           

 

                                        Serum or plasma aspartate aminotransfera

se measurement (enzymatic 

activity/volume)           23 U/L                    5-34        

 

                                        Serum or plasma alanine aminotransferase

 measurement (enzymatic activity/volume)

                          30 U/L                    0-55        

 

                    Serum or plasma protein measurement (mass/volume)           

6.4 g/dL            

6.4-8.2        

 

                    Serum or plasma albumin measurement (mass/volume)           

2.8 g/dL            

3.2-4.5        

 

                    CALCIUM CORRECTED           9.1 mg/dL           8.5-10.1    

    

 

                                        Beta-hydroxybutyric acid measurement - 0

20 12:25         

 

                    Beta-hydroxybutyric acid measurement           0.13 mmol/L  

         0.00-0.27  

      

 

                                        Serum or plasma lithium measurement (mol

es/volume) - 20 12:25         

 

                    BNP PT              3718.6 pg/mL           <100.0        

 

                                        Capillary blood glucose measurement by g

lucometer (mass/volume) - 20 12:29

         

 

                          Capillary blood glucose measurement by glucometer (mas

s/volume)           179 

mg/dL                                           



                                                                



Encounters

      



                ACCT No.           Visit Date/Time           Discharge          

 Status         

             Pt. Type           Provider           Facility           Loc./Unit 

          

Complaint        

 

                [id number]           2020 09:00:00           2020 23:59:

59           CLS

                Outpatient           NISHA MACEDO MD                        

   [facility]                                  

 

             [id number]           2020 08:40:00                              

       Document

 Registration                                                                   

 

 

             [id number]           2020 09:00:00                              

       Document

 Registration                                                                   

 

 

             [id number]           2019 13:20:00                              

       Document

 Registration                                                                   

 

 

                    [id number]           2020 20:15:00            20:33:00        

                DIS             Emergency           ROWENA JACOME APRN         

  [facility]           ER                        FALL/ HEAD INJURY      

  

 

                    [id number]           2020 08:28:00            23:59:59        

                CLS             Outpatient           HUANG HOLLY MD      

     [facility] 

[facility]           SDC                       IRON DEF ANEMIA

        

 

                    [id number]           2020 13:58:00            17:24:00        

                DIS             Emergency           ROWENA JACOME APRN         

  [facility]           ER                        SWOLLEN TESTICLES      

  

 

                    [id number]           2020 10:07:00            23:59:59        

                CLS             Outpatient           NISHA MACEDO MD        

   [facility]           RAD                       BILATERAL TESTICULAR SW

E[place]      

  

 

                    [id number]           2020 20:04:00           

020 22:04:00        

                DIS             Emergency           MAULIK GARVIN MD      

     [facility] 

[facility]           ER                        SWOLLEN TESTICL

ES        

 

                    [id number]           2019 16:00:00           

019 20:21:00        

                DIS             Emergency           ROWENA JACOME         

  [facility]           ER                        COUGH / CONGESTION / BA

CK PAIN      

  

 

             [id number]           2020 12:39:00                         

            

Document Registration

## 2020-05-08 NOTE — CONSULTATION-CARDIOLOGY
HPI-Cardiology


Cardiology Consultation:


Date of Consultation


20


Time Seen by a Provider:  18:10


Date of Admission





Attending Physician


Carolyn Parks MD


Admitting Physician


[place]/[facility]


Consulting Physician


VICTOR MANUEL OCHOA MD, MA, FACP, FAC, [facility], [facility]





Physician requesting consult: Dr Parks





HPI:


Chief Complaint:





Reason for consultation: CHF





HPI





Mr. Mcmullen is a 40 year old male being admitted through the ED d/t acute CHF.  He

states he has chronic LE swelling and had been on Lasix 40mg BID for several 

years which has been controlling the swelling, however, the last time his Lasix 

was filled it was only a supply for daily.  He reports increasing LE swelling, 

scrotal edema, abdominal distension and bilat arm swelling.  He reports he had 

lab done by his PCP at [facility] a few days ago and was told his lab showed his 

potassium had something wrong with it.  He reports increasing PORRAS which has been

limiting his activity.  He reports he was diagnosed with IDDM at age 24.  He 

reports he sees nephrology services, Dr. Lluvia Stewart.  He denies every 

having any dialysis in the past.  He denies any CP, palpitations, syncope or 

near syncope.  He denies any recreational drug use.  He reports he quit smoking 

several years ago.  He states he is trying to get his symptoms better controlled

so he can move back of [place] in the next few days to be closer to family.  He 

denies any fever or chills.  He denies any n/v/d.





Review of Systems-Cardiology


Review of Systems


Constitutional:  No chills, No fever; malaise


Eyes:  other (reports recent left eye surgery d/t retinal detachment; reports 

chronic floater in his right eye)


Ears/Nose/Throat:  No epistaxis, No recent hearing loss, No throat pain, No 

throat swelling; other (swelling of the left side of his face)


Respiratory:  As described under HPI


Cardiovascular:  As described under HPI


Gastrointestinal:  abdomen distended; No constipation, No diarrhea, No nausea, 

No vomiting


Genitourinary:  No dysuria, No hematuria; other (scrotal swelling)


Musculoskeletal:  no symptoms reported


Skin:  As described under HPI; No rash on exposed areas, No ulcerations on 

exposed areas


Psychiatric/Neurological:  anxiety, depression; No seizure, No syncope


Hematologic:  No bleeding abnormalities





All Other Systems Reviewed


Negative Unless Noted:  Yes





PMH-Social-Family Hx


Patient Social History


Alcohol Use:  Denies Use


Recreational Drug Use:  No


Smoking Status:  Never a Smoker


2nd Hand Smoke Exposure:  No


Recent Foreign Travel:  No


Recent Infectious Disease Expo:  No


Hospitalization with Isolation:  Denies


Physical Abuse Screen:  No


Sexual Abuse:  No





Immunizations Up To Date


Tetanus Booster (TDap):  Less than 5yrs


Date of Pneumonia Vaccine:  May 8, 2019





Past Medical History


PMH


As described under Assessment.





Family Medical History


Family Medical History:  


He reports his father has DM and asthma.  Mother has HTN, sleep apnea,


asthma, depression.





Allergies and Home Medications


Allergies


Coded Allergies:  


     Statins-Hmg-Coa Reductase Inhibitor (Unverified  Adverse Reaction, Unknown,

20)


     latex (Unverified  Adverse Reaction, Unknown, 20)





Home Medications


Albuterol Sulfate 1 Puff Puff, 2 PUFF IH Q4H PRN for WEAKNESS


   1 PUFF = 90 MCG 


   Prescribed by: ROWENA JACOME on 19 1827


Aspirin 81 Mg Tablet.dr, 81 MG PO DAILY, (Reported)


Furosemide 40 Mg Tablet, 40 MG PO BID, (Reported)


Hydralazine HCl 50 Mg Tablet, 50 MG PO Q8H, (Reported)


Insulin Aspart 100 Unit/1 Ml Susp, 0 SQ UD, (Reported)


Insulin Glargine,Hum.rec.anlog 300 Unit/1 Ml Insuln.pen, 15 UNIT SQ DAILY, 

(Reported)


Metoprolol Tartrate 100 Mg Tablet, 100 MG PO TID, (Reported)


Multivitamin 1 Each Tablet, 1 EACH PO DAILY, (Reported)


Niacinamide 500 Mg Tablet, 500 MG PO DAILY, (Reported)


Omega 3 Polyunsat Fatty Acids 1,000 Mg Cap, 1,000 MG PO DAILY, (Reported)





Patient Home Medication List


Home Medication List Reviewed:  Yes





Physical Exam-Cardiology


Physical Exam


Vital Signs/I&O











 20





 12:14 15:59 16:18 17:35


 


Temp 37.2  36.7 36.4


 


Pulse 80 88 83 95


 


Resp  18 20


 


B/P (MAP) 160/82 (108) 160/88 170/110 188/129 (148)


 


Pulse Ox 100 100 100 97


 


O2 Delivery   Room Air Room Air


 


    





 20   





 18:10   


 


O2 Delivery Room Air   





Capillary Refill : NONELess Than 3 Seconds


Constitutional:  AAO x 3, well-developed, well-nourished


HEENT:  PERRL, hearing is well preserved


Neck:  No carotid bruit; carotid pulses are 2 + bilaterally


Respiratory:  No accessory muscle use, No respiratory distress; other 

(diminished lower lobes)


Cardiovascular:  regular rate-rhythm; No JVD; S1 and S2, systolic murmur


Gastrointestinal:  No tender; distended; No tenderness; audible bowel sounds


Extremities:  other (bilat pitting edema feet, scrotum, abdomen, arms bilat)


Neurologic/Psychiatric:  grossly intact (moves all extremities)


Skin:  No rash on exposed areas, No ulcerations on exposed areas





Data Review


Labs


Laboratory Tests


20 12:19: 


Urine Color YELLOW, Urine Clarity CLEAR, Urine pH 6.0, Urine Specific Gravity 

1.020, Urine Protein 3+H, Urine Glucose (UA) 3+H, Urine Ketones NEGATIVE, Urine 

Nitrite NEGATIVE, Urine Bilirubin NEGATIVE, Urine Urobilinogen 0.2, Urine 

Leukocyte Esterase NEGATIVE, Urine RBC (Auto) 1+H, Urine RBC 10-25H, Urine WBC 

0-2, Urine Crystals NONE, Urine Bacteria TRACE, Urine Casts PRESENT, Urine 

Granular Casts 5-10H, Urine Mucus NEGATIVE, Urine Culture Indicated NO


20 12:25: 


White Blood Count 10.6, Red Blood Count 3.14L, Hemoglobin 9.0L, Hematocrit 28L, 

Mean Corpuscular Volume 89, Mean Corpuscular Hemoglobin 29, Mean Corpuscular 

Hemoglobin Concent 32, Red Cell Distribution Width 16.3H, Platelet Count 348, 

Mean Platelet Volume 10.0, Neutrophils (%) (Auto) 63, Lymphocytes (%) (Auto) 21,

Monocytes (%) (Auto) 8, Eosinophils (%) (Auto) 6, Basophils (%) (Auto) 1, 

Neutrophils # (Auto) 6.7, Lymphocytes # (Auto) 2.3, Monocytes # (Auto) 0.9, 

Eosinophils # (Auto) 0.7H, Basophils # (Auto) 0.1, Sodium Level 137, Potassium 

Level 6.0H, Chloride Level 113H, Carbon Dioxide Level 16L, Anion Gap 8, Blood 

Urea Nitrogen 60H, Creatinine 4.15H, Estimat Glomerular Filtration Rate 16, 

BUN/Creatinine Ratio 14, Glucose Level 159H, Calcium Level 8.1L, Corrected 

Calcium 9.1, Total Bilirubin 0.2, Aspartate Amino Transf (AST/SGOT) 23, Alanine 

Aminotransferase (ALT/SGPT) 30, Alkaline Phosphatase 142H, B-Type Natriuretic 

Peptide 3718.6H, Total Protein 6.4, Albumin 2.8L, Beta-Hydroxybutyrate (Chem 

panel) 0.13


20 12:29: Glucometer 179H


20 18:51: 


Sodium Level 138, Potassium Level 5.5H, Chloride Level 111H, Carbon Dioxide Lev

el 17L, Anion Gap 10, Blood Urea Nitrogen 60H, Creatinine 3.99H, Estimat Guerline

merular Filtration Rate 17, BUN/Creatinine Ratio 15, Glucose Level 161H, Calcium

Level 7.9L








A/P-Cardiology


Assessment/Admission Diagnosis





Anasarca





Chronic systolic CHF of undetermined etiology





Nehrotic syndrome and acute on chronic renal failure. CKD stage 4-5 - follows 

with nephrologist Dr. Lluvia Mchugh. This is being managed by Dr Parks 

during this hospitalization





Hyperkalemia and acidosis - secondary to renal failure - reported to be chronic.

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 This is being managed 

by Dr Parks





Echo of 20: LVEF 35-40%, small to mod pericardial eff w/o any evidence of 

hemodynamic significance, pleural eff, mod MR, mod TR, pulmonary hypertension 

with PASP 52 mmHg





HTN - uncontrolled





IDDM (diagnosed age 24)





HLD per pt report - reported intolerance to statin





Asthma





H/O left retinal detachment, recent surgery





H/O "chronic right eye floater" per pt report





Reports left Charcot foot





Tobaccoism - quit "years ago"





Chronic anemia - oral iron tx per pt





Discussion and Recomendations





* Complex management due to multiple comorbidities


* I called Dr Parks on the phone and discussed the case with her in detail. 

  Dr Parks is managing renal failure, hyperkalemia and acidosis


* Continue antihypertensive regimen


* Diuretics as needed and tolerated


* We recommend consideration of transfer to a tertiary care facility with 

  Nephrology services. Pt may need dialysis / ultrafiltration to treat anasarca 

  in the presence of apparently worsening renal failure. I reviewed all of this 

  with Dr Parks





Clinical Quality Measures


DVT/VTE Risk/Contraindication:


Risk Factor Score Per Nursin


RFS Level Per Nursing on Admit:  1=Low/No VTE PPX











VICTOR MANUEL OCHOA MD FACP FAC CCDS    May 8, 2020 19:29

## 2020-05-08 NOTE — DIAGNOSTIC IMAGING REPORT
INDICATION: Abnormal blood work, pleural effusion.



COMPARISON: January 6, 2020.



TECHNIQUE: Single frontal radiograph of the chest dated May 8,

2020.



FINDINGS: The cardiac silhouette is enlarged, similar to the

prior examination. No significant pulmonary vascular congestion.

Moderate right and small left bibasilar pleural-parenchymal

opacities are present, increased from prior examination. Upper

lungs are clear. No pneumothorax. No acute osseous abnormality.



IMPRESSION: Moderate right and small left bibasilar

pleural-parenchymal opacities, increased since the prior exam.

These are felt related to a combination of pleural fluid with

adjacent atelectasis and/or infiltrate.



Persistent enlargement of the cardiac silhouette without

pulmonary vascular congestion.



Dictated by: 



  Dictated on workstation # [id number]

## 2020-05-08 NOTE — CONSULTATION-CARDIOLOGY
HPI-Cardiology


Cardiology Consultation:


Date of Consultation


20


Time Seen by a Provider:  15:30


Date of Admission


2020


Attending Physician


Carolyn Parks MD


Admitting Physician


[place]


Consulting Physician


VICTOR MANUEL OCHOA MD





HPI:


Chief Complaint:


CHF


Mr. Mcmullen is a 40 year old male being admitted through the ED d/t acute CHF.  He

states he has chronic LE swelling and had been on Lasix 40mg BID for several 

years which has been controlling the swelling, however, the last time his Lasix 

was filled it was only a supply for daily.  He reports increasing LE swelling, 

scrotal edema, abdominal distension and bilat arm swelling.  He reports he had 

lab done by his PCP at [facility] a few days ago and was told his lab showed his 

potassium had something wrong with it.  He reports increasing PORRAS which has been

limiting his activity.  He reports he was diagnosed with IDDM at age 24.  He 

reports he sees nephrology services, Dr. Lluvia Stewart.  He denies every 

having any dialysis in the past.  He denies any CP, palpitations, syncope or 

near syncope.  He denies any recreational drug use.  He reports he quit smoking 

several years ago.  He states he is trying to get his symptoms better controlled

so he can move back of [place] in the next few days to be closer to family.  He 

denies any fever or chills.  He denies any n/v/d.





Review of Systems-Cardiology


Review of Systems


Constitutional:  No chills, No fever; malaise


Eyes:  other (reports recent left eye surgery d/t retinal detachment; reports 

chronic floater in his right eye)


Ears/Nose/Throat:  No epistaxis, No recent hearing loss, No throat pain, No 

throat swelling; other (swelling of the left side of his face)


Respiratory:  As described under HPI


Cardiovascular:  As described under HPI


Gastrointestinal:  abdomen distended; No constipation, No diarrhea, No nausea, 

No vomiting


Genitourinary:  No dysuria, No hematuria; other (scrotal swelling)


Musculoskeletal:  no symptoms reported


Skin:  As described under HPI; No rash on exposed areas, No ulcerations on 

exposed areas


Psychiatric/Neurological:  anxiety, depression; No seizure, No syncope


Hematologic:  No bleeding abnormalities





All Other Systems Reviewed


Negative Unless Noted:  Yes





PMH-Social-Family Hx


Patient Social History


Alcohol Use:  Denies Use


Recreational Drug Use:  No


Smoking Status:  Never a Smoker


2nd Hand Smoke Exposure:  No


Recent Foreign Travel:  No





Immunizations Up To Date


Tetanus Booster (TDap):  Less than 5yrs





Past Medical History


PMH


As described under Assessment.





Family Medical History


Family Medical History:  


He reports his father has DM and asthma.  Mother has HTN, sleep apnea,


asthma, depression.





Allergies and Home Medications


Allergies


Coded Allergies:  


     Statins-Hmg-Coa Reductase Inhibitor (Unverified  Adverse Reaction, Unknown,

20)


     latex (Unverified  Adverse Reaction, Unknown, 20)





Home Medications


Albuterol Sulfate 1 Puff Puff, 2 PUFF IH Q4H PRN for WEAKNESS


   1 PUFF = 90 MCG 


   Prescribed by: ROWENA JACOME on 19


Aspirin 81 Mg Tablet.dr, 81 MG PO DAILY, (Reported)


Furosemide 20 Mg Tablet, 20 MG PO BID, (Reported)


   LAST FILLED 2020 #60/30 DAY SUPPLY 


Hydralazine HCl 50 Mg Tablet, 50 MG PO Q8H, (Reported)


   LAST FILLED 2020 #90/30 DAY SUPPLY 


Insulin Aspart 100 Unit/1 Ml Susp, 0 SQ UD, (Reported)


Insulin Glargine,Hum.rec.anlog 300 Unit/1 Ml Insuln.pen, 15 UNIT SQ DAILY, 

(Reported)


Metoprolol Succinate 100 Mg Tab.er.24h, 100 MG PO TID, (Reported)


Multivitamin 1 Each Tablet, 1 EACH PO DAILY, (Reported)


Niacinamide 500 Mg Tablet, 500 MG PO DAILY, (Reported)


Omega 3 Polyunsat Fatty Acids 1,000 Mg Cap, 1,000 MG PO DAILY, (Reported)





Patient Home Medication List


Home Medication List Reviewed:  Yes





Physical Exam-Cardiology


Physical Exam


Vital Signs/I&O











 20





 00:24 01:00 04:00 07:00


 


Temp 37.1  36.9 


 


Pulse 64 64 65 65


 


Resp 20  20 


 


B/P (MAP) 135/79 (97)  159/84 (109) 


 


Pulse Ox 96  95 


 


O2 Delivery Room Air  Room Air 


 


    





 20 





 07:18 08:00 08:00 


 


Temp   36.7 


 


Pulse   70 


 


Resp   16 


 


B/P (MAP)   147/89 (108) 


 


Pulse Ox  96 96 


 


O2 Delivery Room Air Room Air Room Air 














 20





 00:00


 


Intake Total 1780 ml


 


Balance 1780 ml





Capillary Refill :


Constitutional:  AAO x 3, well-developed, well-nourished


HEENT:  PERRL, hearing is well preserved


Neck:  No carotid bruit; carotid pulses are 2 + bilaterally


Respiratory:  No accessory muscle use, No respiratory distress; other 

(diminished lower lobes)


Cardiovascular:  regular rate-rhythm; No JVD; S1 and S2, systolic murmur


Gastrointestinal:  No tender; distended; No tenderness; audible bowel sounds


Extremities:  other (bilat pitting edema feet, scrotum, abdomen, arms bilat)


Neurologic/Psychiatric:  grossly intact (moves all extremities)


Skin:  No rash on exposed areas, No ulcerations on exposed areas





Data Review


Labs


Laboratory Tests


20 11:13: Glucometer 342H


20 15:38: Glucometer 252H


20 20:42: Glucometer 148H


20 04:35: Glucometer 72


20 05:03: 


Sodium Level 137, Potassium Level 5.2H, Chloride Level 108H, Carbon Dioxide 

Level 19L, Anion Gap 10, Blood Urea Nitrogen 74H, Creatinine 4.74H, Estimat 

Glomerular Filtration Rate 14, BUN/Creatinine Ratio 16, Glucose Level 61L, 

Calcium Level 7.5L, Magnesium Level 1.8


20 05:12: 


White Blood Count 8.1, Red Blood Count 2.61L, Hemoglobin 7.4L, Hematocrit 23L, 

Mean Corpuscular Volume 89, Mean Corpuscular Hemoglobin 28, Mean Corpuscular 

Hemoglobin Concent 32, Red Cell Distribution Width 15.8H, Platelet Count 310, 

Mean Platelet Volume 9.5





Microbiology


20 Gram Stain - Final, Resulted


          


20 Anaerobic Culture, Resulted


          Pending


20 Wound Culture - Preliminary, Resulted


          Staphylococcus aureus








Radiology


NAME:   ENANGELINANIMESHMICHELLE GARSIA


[facility] REC#:   [id number]


ACCOUNT#:   [id number]


PT STATUS:   REG ER


:   1980


PHYSICIAN:   ROWENA JACOME


ADMIT DATE:   20/ER


***Signed***


Date of Exam:20





CHEST 1 VIEW, AP/PA ONLY








INDICATION: Abnormal blood work, pleural effusion.





COMPARISON: 2020.





TECHNIQUE: Single frontal radiograph of the chest dated May 8,


2020.





FINDINGS: The cardiac silhouette is enlarged, similar to the


prior examination. No significant pulmonary vascular congestion.


Moderate right and small left bibasilar pleural-parenchymal


opacities are present, increased from prior examination. Upper


lungs are clear. No pneumothorax. No acute osseous abnormality.





IMPRESSION: Moderate right and small left bibasilar


pleural-parenchymal opacities, increased since the prior exam.


These are felt related to a combination of pleural fluid with


adjacent atelectasis and/or infiltrate.





Persistent enlargement of the cardiac silhouette without


pulmonary vascular congestion.





Dictated by: 





  Dictated on workstation # [id number]








Dict:   20 1327


Trans:   20 1525


[facility] [id number]





Interpreted by:     JOSE GUADALUPE BAUTISTA MD


Electronically signed by: JOSE GUADALUPE BAUTISTA MD 20 1525





A/P-Cardiology


Assessment/Admission Diagnosis


Anasarca





CHF of undetermined etiology





Acute on chronic renal failure





CKD stage 4-5 - follows with [place] nephrology services, Dr. Teixeira





Hyperkalemia - secondary to renal failure





HTN - uncontrolled





IDDM (diagnosed age 24)





HLD per pt report - reported intolerance to statin





Asthma





H/O left retinal detachment, recent surgery





H/O "chronic right eye floater" per pt report





Reports left Charcot foot





Tobaccoism - quit "years ago"





Chronic anemia - oral iron tx per pt





Discussion and Recomendations


Complex management issue


Acute on chronic renal failure with stage 4-5 CKD


Hyperkalemia likely secondary to renal failure


Uncontrolled hypertension despite Toprol XL 300mg daily and Hydralazine 50mg TID


Advise treatment with diuretics and monitor lab closely


Echocardiogram to eval structure and function


Consider transfer to a tertiary facility with nephrology services, whom he is 

already established with Dr. Lluvia Stewart at [facility]











GOYO MICHAELS            May 8, 2020 16:14

## 2020-05-09 VITALS — SYSTOLIC BLOOD PRESSURE: 147 MMHG | DIASTOLIC BLOOD PRESSURE: 91 MMHG

## 2020-05-09 VITALS — SYSTOLIC BLOOD PRESSURE: 159 MMHG | DIASTOLIC BLOOD PRESSURE: 92 MMHG

## 2020-05-09 VITALS — SYSTOLIC BLOOD PRESSURE: 185 MMHG | DIASTOLIC BLOOD PRESSURE: 100 MMHG

## 2020-05-09 VITALS — SYSTOLIC BLOOD PRESSURE: 180 MMHG | DIASTOLIC BLOOD PRESSURE: 100 MMHG

## 2020-05-09 VITALS — DIASTOLIC BLOOD PRESSURE: 94 MMHG | SYSTOLIC BLOOD PRESSURE: 165 MMHG

## 2020-05-09 VITALS — SYSTOLIC BLOOD PRESSURE: 165 MMHG | DIASTOLIC BLOOD PRESSURE: 98 MMHG

## 2020-05-09 VITALS — DIASTOLIC BLOOD PRESSURE: 102 MMHG | SYSTOLIC BLOOD PRESSURE: 170 MMHG

## 2020-05-09 VITALS — SYSTOLIC BLOOD PRESSURE: 170 MMHG | DIASTOLIC BLOOD PRESSURE: 90 MMHG

## 2020-05-09 LAB
ALBUMIN SERPL-MCNC: 2.5 GM/DL (ref 3.2–4.5)
ALP SERPL-CCNC: 108 U/L (ref 40–136)
ALT SERPL-CCNC: 21 U/L (ref 0–55)
BASOPHILS # BLD AUTO: 0.1 10^3/UL (ref 0–0.1)
BASOPHILS NFR BLD AUTO: 1 % (ref 0–10)
BILIRUB SERPL-MCNC: 0.2 MG/DL (ref 0.1–1)
BUN/CREAT SERPL: 15
CALCIUM SERPL-MCNC: 7.8 MG/DL (ref 8.5–10.1)
CHLORIDE SERPL-SCNC: 113 MMOL/L (ref 98–107)
CHOLEST SERPL-MCNC: 171 MG/DL (ref ?–200)
CO2 SERPL-SCNC: 16 MMOL/L (ref 21–32)
CREAT SERPL-MCNC: 3.93 MG/DL (ref 0.6–1.3)
EOSINOPHIL # BLD AUTO: 0.6 10^3/UL (ref 0–0.3)
EOSINOPHIL NFR BLD AUTO: 8 % (ref 0–10)
ERYTHROCYTE [DISTWIDTH] IN BLOOD BY AUTOMATED COUNT: 15.6 % (ref 10–14.5)
GFR SERPLBLD BASED ON 1.73 SQ M-ARVRAT: 17 ML/MIN
GLUCOSE SERPL-MCNC: 94 MG/DL (ref 70–105)
HCT VFR BLD CALC: 23 % (ref 40–54)
HDLC SERPL-MCNC: 40 MG/DL (ref 40–60)
HGB BLD-MCNC: 7.2 G/DL (ref 13.3–17.7)
LYMPHOCYTES # BLD AUTO: 2.3 X 10^3 (ref 1–4)
LYMPHOCYTES NFR BLD AUTO: 30 % (ref 12–44)
MAGNESIUM SERPL-MCNC: 1.9 MG/DL (ref 1.6–2.4)
MANUAL DIFFERENTIAL PERFORMED BLD QL: NO
MCH RBC QN AUTO: 29 PG (ref 25–34)
MCHC RBC AUTO-ENTMCNC: 32 G/DL (ref 32–36)
MCV RBC AUTO: 89 FL (ref 80–99)
MONOCYTES # BLD AUTO: 0.6 X 10^3 (ref 0–1)
MONOCYTES NFR BLD AUTO: 8 % (ref 0–12)
NEUTROPHILS # BLD AUTO: 4 X 10^3 (ref 1.8–7.8)
NEUTROPHILS NFR BLD AUTO: 53 % (ref 42–75)
PHOSPHATE SERPL-MCNC: 4.5 MG/DL (ref 2.3–4.7)
PLATELET # BLD: 286 10^3/UL (ref 130–400)
PMV BLD AUTO: 9.6 FL (ref 7.4–10.4)
POTASSIUM SERPL-SCNC: 5.1 MMOL/L (ref 3.6–5)
PROT SERPL-MCNC: 5.2 GM/DL (ref 6.4–8.2)
SODIUM SERPL-SCNC: 138 MMOL/L (ref 135–145)
TRIGL SERPL-MCNC: 133 MG/DL (ref ?–150)
VLDLC SERPL CALC-MCNC: 27 MG/DL (ref 5–40)
WBC # BLD AUTO: 7.6 10^3/UL (ref 4.3–11)

## 2020-05-09 RX ADMIN — SODIUM BICARBONATE TAB 650 MG SCH MG: 650 TAB at 22:00

## 2020-05-09 RX ADMIN — INSULIN ASPART SCH UNIT: 100 INJECTION, SOLUTION INTRAVENOUS; SUBCUTANEOUS at 21:55

## 2020-05-09 RX ADMIN — HYDRALAZINE HYDROCHLORIDE SCH MG: 25 TABLET ORAL at 05:49

## 2020-05-09 RX ADMIN — METOPROLOL TARTRATE SCH MG: 50 TABLET, FILM COATED ORAL at 22:00

## 2020-05-09 RX ADMIN — INSULIN ASPART SCH UNIT: 100 INJECTION, SOLUTION INTRAVENOUS; SUBCUTANEOUS at 05:41

## 2020-05-09 RX ADMIN — Medication SCH ML: at 22:01

## 2020-05-09 RX ADMIN — SODIUM BICARBONATE TAB 650 MG SCH MG: 650 TAB at 16:33

## 2020-05-09 RX ADMIN — FUROSEMIDE SCH MG: 10 INJECTION, SOLUTION INTRAVENOUS at 16:33

## 2020-05-09 RX ADMIN — ASPIRIN SCH MG: 81 TABLET ORAL at 08:56

## 2020-05-09 RX ADMIN — SODIUM BICARBONATE TAB 650 MG SCH MG: 650 TAB at 13:09

## 2020-05-09 RX ADMIN — METOPROLOL TARTRATE SCH MG: 50 TABLET, FILM COATED ORAL at 13:09

## 2020-05-09 RX ADMIN — Medication SCH ML: at 13:09

## 2020-05-09 RX ADMIN — INSULIN ASPART SCH UNIT: 100 INJECTION, SOLUTION INTRAVENOUS; SUBCUTANEOUS at 11:57

## 2020-05-09 RX ADMIN — FUROSEMIDE SCH MG: 10 INJECTION, SOLUTION INTRAVENOUS at 05:49

## 2020-05-09 RX ADMIN — HYDRALAZINE HYDROCHLORIDE SCH MG: 25 TABLET ORAL at 22:00

## 2020-05-09 RX ADMIN — Medication SCH ML: at 05:40

## 2020-05-09 RX ADMIN — INSULIN ASPART SCH UNIT: 100 INJECTION, SOLUTION INTRAVENOUS; SUBCUTANEOUS at 16:33

## 2020-05-09 RX ADMIN — HYDRALAZINE HYDROCHLORIDE SCH MG: 25 TABLET ORAL at 13:09

## 2020-05-09 RX ADMIN — METOPROLOL TARTRATE SCH MG: 50 TABLET, FILM COATED ORAL at 08:56

## 2020-05-09 NOTE — PROGRESS NOTE - CARDIOLOGY
Cardiology SOAP Progress Note


Subjective:


Does not report cp or palp or syncope or shortness of breath at rest


Still has gen swelling


States urine output has picked up since admission


Denies focal weakness


Has gen malaise, mild


Denies n/v/d





Objective:


I&O/Vital Signs











 5/9/20 5/9/20 5/9/20 5/9/20





 04:19 05:48 06:40 07:50


 


Temp 36.7   


 


Pulse 73  81 


 


Resp 16   


 


B/P (MAP) 180/100 (126) 185/100 (128)  


 


Pulse Ox 100   95


 


O2 Delivery Room Air   Room Air


 


    





 5/9/20 5/9/20 5/9/20 5/9/20





 08:00 08:45 08:59 11:30


 


Temp  36.6  


 


B/P (MAP)  170/90 (116) 159/92 (114) 147/91 (109)


 


Pulse Ox 95 95  


 


O2 Delivery Room Air Room Air  


 


    





 5/9/20 5/9/20 5/9/20 





 11:45 11:46 13:00 


 


Pulse   91 


 


Pulse Ox 97 97  


 


O2 Delivery Room Air Room Air  














 5/9/20





 00:00


 


Intake Total 225 ml


 


Output Total 750 ml


 


Balance -525 ml








Constitutional:  AAO x 3, well-developed, well-nourished


Respiratory:  No accessory muscle use, No respiratory distress; other 

(diminished lower lobes)


Cardiovascular:  regular rate-rhythm; No JVD; S1 and S2, systolic murmur


Gastrointestional:  No tender; distended; No tenderness; audible bowel sounds


Extremities:  other (bilat pitting edema feet, scrotum, abdomen, arms bilat)


Neurologic/Psychiatric:  grossly intact (moves all extremities)


Skin:  No rash on exposed areas, No ulcerations on exposed areas





Results/Procedures:


Labs


Laboratory Tests


5/8/20 18:51: 


Sodium Level 138, Potassium Level 5.5H, Chloride Level 111H, Carbon Dioxide 

Level 17L, Anion Gap 10, Blood Urea Nitrogen 60H, Creatinine 3.99H, Estimat 

Glomerular Filtration Rate 17, BUN/Creatinine Ratio 15, Glucose Level 161H, 

Calcium Level 7.9L


5/8/20 21:50: Glucometer 174H


5/9/20 02:39: 


Sodium Level 138, Potassium Level 5.1H, Chloride Level 113H, Carbon Dioxide 

Level 16L, Anion Gap 9, Blood Urea Nitrogen 60H, Creatinine 3.93H, Estimat 

Glomerular Filtration Rate 17, BUN/Creatinine Ratio 15, Glucose Level 94, 

Calcium Level 7.8L, White Blood Count 7.6, Red Blood Count 2.52L, Hemoglobin 

7.2L, Hematocrit 23L, Mean Corpuscular Volume 89, Mean Corpuscular Hemoglobin 

29, Mean Corpuscular Hemoglobin Concent 32, Red Cell Distribution Width 15.6H, 

Platelet Count 286, Mean Platelet Volume 9.6, Neutrophils (%) (Auto) 53, 

Lymphocytes (%) (Auto) 30, Monocytes (%) (Auto) 8, Eosinophils (%) (Auto) 8, 

Basophils (%) (Auto) 1, Neutrophils # (Auto) 4.0, Lymphocytes # (Auto) 2.3, Mo

nocytes # (Auto) 0.6, Eosinophils # (Auto) 0.6H, Basophils # (Auto) 0.1, Correc

bal Calcium 9.0, Phosphorus Level 4.5, Magnesium Level 1.9, Total Bilirubin 0.2,

Aspartate Amino Transf (AST/SGOT) 14, Alanine Aminotransferase (ALT/SGPT) 21, 

Alkaline Phosphatase 108, Total Protein 5.2L, Albumin 2.5L, Triglycerides Level 

133, Cholesterol Level 171, LDL Cholesterol Direct 104, VLDL Cholesterol 27, HDL

Cholesterol 40


5/9/20 05:54: Glucometer 61L


5/9/20 08:54: Glucometer 173H


5/9/20 11:35: Glucometer 191H





Laboratory Tests


5/8/20 12:25








5/8/20 18:51








5/9/20 02:39











A/P:


Assessment:





Anasarca





Chronic systolic CHF of undetermined etiology





Nephrotic syndrome and acute-on-chronic renal failure. CKD stage 4-5 - follows 

with nephrologist Dr. Lluvia Mchugh. This is being managed by Dr Parks 

during this hospitalization





Hyperkalemia and acidosis - secondary to renal failure - reported to be chronic.

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 This is being managed 

by Dr Parks





Echo of 5/8/20: LVEF 35-40%, small to mod pericardial eff w/o any evidence of 

hemodynamic significance, pleural eff, mod MR, mod TR, pulmonary hypertension 

with PASP 52 mmHg





HTN - uncontrolled





IDDM (diagnosed age 24)





HLD per pt report - reported intolerance to statin





Asthma





H/O left retinal detachment, recent surgery





H/O "chronic right eye floater" per pt report





Reports left Charcot foot





Tobaccoism - quit "years ago"





Chronic anemia - oral iron tx per pt


Plan:





* Complex management due to multiple comorbidities


* I discussed his case with Dr Parks on the phone on 5/8/20. Dr Parks is 

  managing renal failure, hyperkalemia and acidosis


* For ac-on-ch systolic CHF, continue bb. Not suitable for ACE-inhib or ARB or 

  Entresto, given ac-on-ch renal failure


* Continue antihypertensive regimen


* Diuretics as needed and tolerated


* May need transfer for dialysis / ultrafiltration to treat anasarca if 

  unresponsive to diuretics. I have reviewed all of this with VICTOR MANUEL Leon MD FACP FACC CCDS    May 9, 2020 13:12
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
## 2020-05-09 NOTE — NUR
pt arrived to floor via WC with ICU staff, pt oriented to room and call light, denies needs 
at this time, will continue to monitor.

## 2020-05-10 VITALS — DIASTOLIC BLOOD PRESSURE: 93 MMHG | SYSTOLIC BLOOD PRESSURE: 162 MMHG

## 2020-05-10 VITALS — DIASTOLIC BLOOD PRESSURE: 90 MMHG | SYSTOLIC BLOOD PRESSURE: 150 MMHG

## 2020-05-10 VITALS — DIASTOLIC BLOOD PRESSURE: 84 MMHG | SYSTOLIC BLOOD PRESSURE: 153 MMHG

## 2020-05-10 VITALS — SYSTOLIC BLOOD PRESSURE: 146 MMHG | DIASTOLIC BLOOD PRESSURE: 86 MMHG

## 2020-05-10 VITALS — DIASTOLIC BLOOD PRESSURE: 94 MMHG | SYSTOLIC BLOOD PRESSURE: 160 MMHG

## 2020-05-10 VITALS — SYSTOLIC BLOOD PRESSURE: 171 MMHG | DIASTOLIC BLOOD PRESSURE: 107 MMHG

## 2020-05-10 LAB
ALBUMIN SERPL-MCNC: 2.5 GM/DL (ref 3.2–4.5)
ALP SERPL-CCNC: 110 U/L (ref 40–136)
ALT SERPL-CCNC: 17 U/L (ref 0–55)
BASOPHILS # BLD AUTO: 0.1 10^3/UL (ref 0–0.1)
BASOPHILS NFR BLD AUTO: 1 % (ref 0–10)
BILIRUB SERPL-MCNC: 0.1 MG/DL (ref 0.1–1)
BUN/CREAT SERPL: 15
CALCIUM SERPL-MCNC: 7.6 MG/DL (ref 8.5–10.1)
CHLORIDE SERPL-SCNC: 112 MMOL/L (ref 98–107)
CO2 SERPL-SCNC: 16 MMOL/L (ref 21–32)
CREAT SERPL-MCNC: 4.08 MG/DL (ref 0.6–1.3)
EOSINOPHIL # BLD AUTO: 0.4 10^3/UL (ref 0–0.3)
EOSINOPHIL NFR BLD AUTO: 5 % (ref 0–10)
ERYTHROCYTE [DISTWIDTH] IN BLOOD BY AUTOMATED COUNT: 16 % (ref 10–14.5)
GFR SERPLBLD BASED ON 1.73 SQ M-ARVRAT: 17 ML/MIN
GLUCOSE SERPL-MCNC: 71 MG/DL (ref 70–105)
HCT VFR BLD CALC: 23 % (ref 40–54)
HGB BLD-MCNC: 7.4 G/DL (ref 13.3–17.7)
LYMPHOCYTES # BLD AUTO: 2.3 X 10^3 (ref 1–4)
LYMPHOCYTES NFR BLD AUTO: 28 % (ref 12–44)
MANUAL DIFFERENTIAL PERFORMED BLD QL: NO
MCH RBC QN AUTO: 29 PG (ref 25–34)
MCHC RBC AUTO-ENTMCNC: 32 G/DL (ref 32–36)
MCV RBC AUTO: 89 FL (ref 80–99)
MONOCYTES # BLD AUTO: 0.7 X 10^3 (ref 0–1)
MONOCYTES NFR BLD AUTO: 9 % (ref 0–12)
NEUTROPHILS # BLD AUTO: 4.6 X 10^3 (ref 1.8–7.8)
NEUTROPHILS NFR BLD AUTO: 57 % (ref 42–75)
PLATELET # BLD: 288 10^3/UL (ref 130–400)
PMV BLD AUTO: 9.8 FL (ref 7.4–10.4)
POTASSIUM SERPL-SCNC: 5.5 MMOL/L (ref 3.6–5)
PROT SERPL-MCNC: 5.2 GM/DL (ref 6.4–8.2)
SODIUM SERPL-SCNC: 137 MMOL/L (ref 135–145)
WBC # BLD AUTO: 8 10^3/UL (ref 4.3–11)

## 2020-05-10 RX ADMIN — INSULIN ASPART SCH UNIT: 100 INJECTION, SOLUTION INTRAVENOUS; SUBCUTANEOUS at 06:05

## 2020-05-10 RX ADMIN — FUROSEMIDE SCH MG: 10 INJECTION, SOLUTION INTRAVENOUS at 06:20

## 2020-05-10 RX ADMIN — AMLODIPINE BESYLATE SCH MG: 5 TABLET ORAL at 09:18

## 2020-05-10 RX ADMIN — FUROSEMIDE SCH MG: 10 INJECTION, SOLUTION INTRAVENOUS at 17:15

## 2020-05-10 RX ADMIN — INSULIN ASPART SCH UNIT: 100 INJECTION, SOLUTION INTRAVENOUS; SUBCUTANEOUS at 17:10

## 2020-05-10 RX ADMIN — METOPROLOL TARTRATE SCH MG: 50 TABLET, FILM COATED ORAL at 21:07

## 2020-05-10 RX ADMIN — HYDRALAZINE HYDROCHLORIDE SCH MG: 25 TABLET ORAL at 06:20

## 2020-05-10 RX ADMIN — SODIUM BICARBONATE TAB 650 MG SCH MG: 650 TAB at 21:07

## 2020-05-10 RX ADMIN — ASPIRIN SCH MG: 81 TABLET ORAL at 08:33

## 2020-05-10 RX ADMIN — HYDRALAZINE HYDROCHLORIDE SCH MG: 25 TABLET ORAL at 22:25

## 2020-05-10 RX ADMIN — Medication SCH ML: at 22:25

## 2020-05-10 RX ADMIN — Medication SCH ML: at 13:30

## 2020-05-10 RX ADMIN — Medication SCH ML: at 06:04

## 2020-05-10 RX ADMIN — INSULIN ASPART SCH UNIT: 100 INJECTION, SOLUTION INTRAVENOUS; SUBCUTANEOUS at 21:07

## 2020-05-10 RX ADMIN — METOPROLOL TARTRATE SCH MG: 50 TABLET, FILM COATED ORAL at 13:26

## 2020-05-10 RX ADMIN — SODIUM BICARBONATE TAB 650 MG SCH MG: 650 TAB at 13:26

## 2020-05-10 RX ADMIN — SODIUM BICARBONATE TAB 650 MG SCH MG: 650 TAB at 17:15

## 2020-05-10 RX ADMIN — HYDRALAZINE HYDROCHLORIDE SCH MG: 25 TABLET ORAL at 13:30

## 2020-05-10 RX ADMIN — METOPROLOL TARTRATE SCH MG: 50 TABLET, FILM COATED ORAL at 08:33

## 2020-05-10 RX ADMIN — INSULIN ASPART SCH UNIT: 100 INJECTION, SOLUTION INTRAVENOUS; SUBCUTANEOUS at 12:03

## 2020-05-10 RX ADMIN — SODIUM BICARBONATE TAB 650 MG SCH MG: 650 TAB at 08:33

## 2020-05-10 RX ADMIN — SODIUM POLYSTYRENE SULFONATE SCH GM: 15 SUSPENSION ORAL; RECTAL at 13:27

## 2020-05-10 NOTE — PROGRESS NOTE - CARDIOLOGY
Cardiology SOAP Progress Note


Subjective:


Shortness of breath and leg swelling modestly improved


No cp or palp or syncope


No n/v/d


Gen malaise 


No focal weakness





Objective:


I&O/Vital Signs











 5/10/20 5/10/20 5/10/20 5/10/20





 04:34 06:26 07:22 08:00


 


Temp 36.9   


 


Pulse 71 74  


 


Resp 18   


 


B/P (MAP) 160/94 (116)   


 


Pulse Ox 98   


 


O2 Delivery Room Air  Room Air Room Air


 


    





 5/10/20 5/10/20  





 08:00 12:00  


 


Temp 36.6 36.7  


 


Pulse 78 69  


 


Resp 16 16  


 


B/P (MAP) 171/107 (128) 150/90 (110)  


 


Pulse Ox 98 97  


 


O2 Delivery Room Air Room Air  














 5/10/20





 00:00


 


Intake Total 2270 ml


 


Output Total 600 ml


 


Balance 1670 ml








Constitutional:  AAO x 3, well-developed, well-nourished


Respiratory:  No accessory muscle use, No respiratory distress; other (diminishe

d lower lobes)


Cardiovascular:  regular rate-rhythm; No JVD; S1 and S2, systolic murmur


Gastrointestional:  No tender; distended; No tenderness; audible bowel sounds


Extremities:  other (bilat pitting edema feet, scrotum, abdomen, arms bilat)


Neurologic/Psychiatric:  grossly intact (moves all extremities)


Skin:  No rash on exposed areas, No ulcerations on exposed areas





Results/Procedures:


Labs


Laboratory Tests


5/9/20 15:44: Glucometer 197H


5/9/20 21:00: Glucometer 105


5/10/20 01:33: Glucometer 52*L


5/10/20 02:02: Glucometer 112H


5/10/20 04:18: 


White Blood Count 8.0, Red Blood Count 2.58L, Hemoglobin 7.4L, Hematocrit 23L, 

Mean Corpuscular Volume 89, Mean Corpuscular Hemoglobin 29, Mean Corpuscular 

Hemoglobin Concent 32, Red Cell Distribution Width 16.0H, Platelet Count 288, 

Mean Platelet Volume 9.8, Neutrophils (%) (Auto) 57, Lymphocytes (%) (Auto) 28, 

Monocytes (%) (Auto) 9, Eosinophils (%) (Auto) 5, Basophils (%) (Auto) 1, 

Neutrophils # (Auto) 4.6, Lymphocytes # (Auto) 2.3, Monocytes # (Auto) 0.7, 

Eosinophils # (Auto) 0.4H, Basophils # (Auto) 0.1, Sodium Level 137, Potassium 

Level 5.5H, Chloride Level 112H, Carbon Dioxide Level 16L, Anion Gap 9, Blood 

Urea Nitrogen 63H, Creatinine 4.08H, Estimat Glomerular Filtration Rate 17, 

BUN/Creatinine Ratio 15, Glucose Level 71, Calcium Level 7.6L, Corrected Calcium

8.8, Total Bilirubin 0.1, Aspartate Amino Transf (AST/SGOT) 14, Alanine 

Aminotransferase (ALT/SGPT) 17, Alkaline Phosphatase 110, Total Protein 5.2L, 

Albumin 2.5L


5/10/20 07:57: Glucometer 171H


5/10/20 11:08: Glucometer 186H





Laboratory Tests


5/8/20 18:51








5/9/20 02:39








5/10/20 04:18











A/P:


Assessment:





Anasarca





Chronic systolic CHF of undetermined etiology





Nephrotic syndrome and acute-on-chronic renal failure. CKD stage 4-5 - follows 

with nephrologist Dr. Lluvia Mchugh. This is being managed by Dr Parks 

during this hospitalization





Hyperkalemia and acidosis - secondary to renal failure - reported to be chronic.

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 This is being managed 

by Dr Parks





Echo of 5/8/20: LVEF 35-40%, small to mod pericardial eff w/o any evidence of 

hemodynamic significance, pleural eff, mod MR, mod TR, pulmonary hypertension 

with PASP 52 mmHg





HTN - uncontrolled





IDDM (diagnosed age 24)





HLD per pt report - reported intolerance to statin





Asthma





H/O left retinal detachment, recent surgery. H/O "chronic right eye floater" per

pt report





Reports left Charcot foot





Tobaccoism - quit "years ago"





Chronic anemia - probably related to chronic renal failure - managed by the Med 

Svce


Plan:





* Complex management due to multiple comorbidities


* I discussed his case with Dr Parks on the phone on 5/8/20. Dr Parks is 

  managing renal failure, hyperkalemia and acidosis


* For ac-on-ch systolic CHF, continue bb. Not suitable for ACE-inhib or ARB or 

  Entresto, given ac-on-ch renal failure


* Repeat echo tomorrow to f/u on pericardial fluid


* Continue antihypertensive regimen


* Diuretics as needed and tolerated


* May need transfer for dialysis / ultrafiltration to treat anasarca if 

  unresponsive to diuretics. I have reviewed all of this with VICTOR MANUEL Leon MD FACP FACC [facility]   May 10, 2020 13:05
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
## 2020-05-11 VITALS — DIASTOLIC BLOOD PRESSURE: 93 MMHG | SYSTOLIC BLOOD PRESSURE: 158 MMHG

## 2020-05-11 VITALS — SYSTOLIC BLOOD PRESSURE: 151 MMHG | DIASTOLIC BLOOD PRESSURE: 88 MMHG

## 2020-05-11 VITALS — SYSTOLIC BLOOD PRESSURE: 128 MMHG | DIASTOLIC BLOOD PRESSURE: 72 MMHG

## 2020-05-11 VITALS — DIASTOLIC BLOOD PRESSURE: 91 MMHG | SYSTOLIC BLOOD PRESSURE: 160 MMHG

## 2020-05-11 VITALS — DIASTOLIC BLOOD PRESSURE: 78 MMHG | SYSTOLIC BLOOD PRESSURE: 138 MMHG

## 2020-05-11 VITALS — DIASTOLIC BLOOD PRESSURE: 90 MMHG | SYSTOLIC BLOOD PRESSURE: 158 MMHG

## 2020-05-11 LAB
BASOPHILS # BLD AUTO: 0.1 10^3/UL (ref 0–0.1)
BASOPHILS NFR BLD AUTO: 1 % (ref 0–10)
BUN/CREAT SERPL: 15
CALCIUM SERPL-MCNC: 7.4 MG/DL (ref 8.5–10.1)
CHLORIDE SERPL-SCNC: 108 MMOL/L (ref 98–107)
CO2 SERPL-SCNC: 18 MMOL/L (ref 21–32)
CREAT SERPL-MCNC: 4.4 MG/DL (ref 0.6–1.3)
EOSINOPHIL # BLD AUTO: 0.4 10^3/UL (ref 0–0.3)
EOSINOPHIL NFR BLD AUTO: 5 % (ref 0–10)
ERYTHROCYTE [DISTWIDTH] IN BLOOD BY AUTOMATED COUNT: 15.6 % (ref 10–14.5)
GFR SERPLBLD BASED ON 1.73 SQ M-ARVRAT: 15 ML/MIN
GLUCOSE SERPL-MCNC: 67 MG/DL (ref 70–105)
HCT VFR BLD CALC: 23 % (ref 40–54)
HGB BLD-MCNC: 7.3 G/DL (ref 13.3–17.7)
LYMPHOCYTES # BLD AUTO: 2.4 X 10^3 (ref 1–4)
LYMPHOCYTES NFR BLD AUTO: 30 % (ref 12–44)
MANUAL DIFFERENTIAL PERFORMED BLD QL: NO
MCH RBC QN AUTO: 29 PG (ref 25–34)
MCHC RBC AUTO-ENTMCNC: 32 G/DL (ref 32–36)
MCV RBC AUTO: 89 FL (ref 80–99)
MONOCYTES # BLD AUTO: 0.8 X 10^3 (ref 0–1)
MONOCYTES NFR BLD AUTO: 10 % (ref 0–12)
NEUTROPHILS # BLD AUTO: 4.2 X 10^3 (ref 1.8–7.8)
NEUTROPHILS NFR BLD AUTO: 53 % (ref 42–75)
PLATELET # BLD: 295 10^3/UL (ref 130–400)
PMV BLD AUTO: 9.8 FL (ref 7.4–10.4)
POTASSIUM SERPL-SCNC: 5 MMOL/L (ref 3.6–5)
SODIUM SERPL-SCNC: 136 MMOL/L (ref 135–145)
WBC # BLD AUTO: 7.8 10^3/UL (ref 4.3–11)

## 2020-05-11 RX ADMIN — FUROSEMIDE SCH MG: 10 INJECTION, SOLUTION INTRAVENOUS at 06:33

## 2020-05-11 RX ADMIN — SODIUM BICARBONATE TAB 650 MG SCH MG: 650 TAB at 12:14

## 2020-05-11 RX ADMIN — SODIUM BICARBONATE TAB 650 MG SCH MG: 650 TAB at 09:14

## 2020-05-11 RX ADMIN — Medication SCH ML: at 22:21

## 2020-05-11 RX ADMIN — Medication SCH ML: at 15:33

## 2020-05-11 RX ADMIN — ASPIRIN SCH MG: 81 TABLET ORAL at 09:14

## 2020-05-11 RX ADMIN — SODIUM POLYSTYRENE SULFONATE SCH GM: 15 SUSPENSION ORAL; RECTAL at 09:14

## 2020-05-11 RX ADMIN — INSULIN ASPART SCH UNIT: 100 INJECTION, SOLUTION INTRAVENOUS; SUBCUTANEOUS at 12:14

## 2020-05-11 RX ADMIN — INSULIN ASPART SCH UNIT: 100 INJECTION, SOLUTION INTRAVENOUS; SUBCUTANEOUS at 06:33

## 2020-05-11 RX ADMIN — SODIUM BICARBONATE TAB 650 MG SCH MG: 650 TAB at 17:40

## 2020-05-11 RX ADMIN — INSULIN ASPART SCH UNIT: 100 INJECTION, SOLUTION INTRAVENOUS; SUBCUTANEOUS at 21:22

## 2020-05-11 RX ADMIN — Medication SCH ML: at 06:33

## 2020-05-11 RX ADMIN — FUROSEMIDE SCH MG: 10 INJECTION, SOLUTION INTRAVENOUS at 17:37

## 2020-05-11 RX ADMIN — HYDRALAZINE HYDROCHLORIDE SCH MG: 25 TABLET ORAL at 06:32

## 2020-05-11 RX ADMIN — METOPROLOL TARTRATE SCH MG: 50 TABLET, FILM COATED ORAL at 21:22

## 2020-05-11 RX ADMIN — HYDRALAZINE HYDROCHLORIDE SCH MG: 25 TABLET ORAL at 15:33

## 2020-05-11 RX ADMIN — SODIUM BICARBONATE TAB 650 MG SCH MG: 650 TAB at 21:22

## 2020-05-11 RX ADMIN — AMLODIPINE BESYLATE SCH MG: 5 TABLET ORAL at 09:14

## 2020-05-11 RX ADMIN — HYDRALAZINE HYDROCHLORIDE SCH MG: 25 TABLET ORAL at 22:22

## 2020-05-11 RX ADMIN — INSULIN ASPART SCH UNIT: 100 INJECTION, SOLUTION INTRAVENOUS; SUBCUTANEOUS at 17:37

## 2020-05-11 RX ADMIN — METOPROLOL TARTRATE SCH MG: 50 TABLET, FILM COATED ORAL at 10:24

## 2020-05-11 NOTE — NUR
SPOKE WITH THE PT AND CALLED [facility] TO COMPLETE THE MED REC



THE MED REC HAD BEEN REVIEWED AND MEDICATIONS CONTINUED THIS PAST WEEKEND BEFORE A MED REC 
WAS DONE.



METOPROLOL: AT ONE POINT THE PT WAS TAKING METOPROLOL TART 100MG 1 TAB TID- HE THINKS THIS 
WAS WHEN HE LIVED IN [place]. THE PT IS NOW SEEN AT [facility] AND THEY HAVE DISPENSED THE MED 
THRU [facility]. THE MEDICATION THAT DISPENSED WAS METOPROLOL SUCC 100MG WITH DIRECTIONS OF 
1 TAB DAILY. ACCORDING TO THE PT HE CALLED THE NURSE AND TOLD THEM HE TAKES THIS MEDICATION 
TID (BUT NOW SPEAKING WITH THE PT HE DIDNT REALIZE IT CAME IN DIFFERENT FORMS)- THE PT SAYS 
HE WAS TOLD TO KEEP TAKING IT HOW HE WAS. THEREFORE THE PT HAS BEEN TAKING METOPROLOL SUCC 
100MG 1 TAB TID. I LET THE [facility] AND HIS NURSE KNOW ABOUT THIS DISCREPANCY



PT USES TOUJEO PENS- HE WAS GETTING THEM THRU THE COMPANY [employer] LAST YEAR, AT ONE 
POINT HE SWITCHED TO A PUMP AND DIDNT NEED IT BUT DUE TO INSURANCE PURPOSES HE HAD TO QUIT 
USING HIS PUMP AND NOW IS USING TOU[employer]O AGAIN. HE INDICATES HE HAS ABOUT 9 PENS LEFT AND THEY 
 IN 



THE FOLLOWING ARE FILL DATES FROM [facility]:

2020 FUROSEMIDE 20MG #60/30DS

2020 HYDRALAZINE 50MG #90/30DS

I DID DOCUMENT THE PAST DUE FILL ON THE MED REC ON THE ABOVE MEDICATIONS

2020 NOVOLOG #5 PENS

2020 METOPROLOL SUCC #90



OTC MEDS:

FISH OIL

NIACIN

ASPIRIN 81

MTV

## 2020-05-11 NOTE — NUR
RD ASSESSMENT 



PMHx: hypercholesterolemia; HTN; DM



PT INTERACTION: Pt was awake and pleasant during nutrition assessment. Pt states current 
appetite is pretty good. Note avg PO intake 81% x2d, per chart review. Pt states following a 
low-sodium diet at home, and has no issues with chewing/swallowing food. Pt states no recent 
issues with nausea, vomiting, constipation, or diarrhea. Note last BM was 5/10, and pt not 
currently on bowel regimen, per chart review. Pt states recent 25# wt gain, but unsure of 
timeframe. Note recent 20# wt gain x1mon, per chart review. Pt states current DM management 
is pretty good, and his avg blood glucose levels are "around 175". Note unable to determine 
recent HbA1c, per chart review. 



ABNORMAL NUTRITION-RELATED LAB VALUES

LOW: glu 67; Ca 7.4

HIGH: Cl 108; BUN 67; cr 4.40



Est. kcal needs: 2603-9631 kcal | 20-25 kcal/kg  

Est. Pro needs:  65-81 g Pro | 0.8-1.0 g Pro/kg 



PES STATEMENT: Given PO intake, no nutrition diagnosis at this time (NO-1.1)



INTERVENTION:  

Continue with current diet order of CHO 75g/m 0snack diet. 

Provided DM education with pt and discussed current management. Pt states using smartphone 
application of Therma-Wave to monitor his CHO intake. Pt states he generally aims for 75 g 
CHO per meal, but may go over sometimes if he is hungry. Discussed with pt options other 
than increasing CHO to get more to eat. Suggested increasing protein intake in order to keep 
CHO around 75 g. Discussed healthy snack options and the relationship between DM and 
snacking. Pt appeared confident to follow suggestions upon discharge. 

Will continue to follow and reassess as pt needs, intake, and status change. 



MONITOR/EVALUATE:  

PO Intake; Plan of Care; Hydration Status; Weight Status; Lab Values 





JOSE ANTONIO Hernandez, MS, RD, LD

## 2020-05-11 NOTE — PROGRESS NOTE - CARDIOLOGY
Cardiology SOAP Progress Note


Subjective:


Does not report cp or shortness of breath or palp or syncope


Swelling modestly improved


No focal weakness


Some gen malaise


No n/v/d





Objective:


I&O/Vital Signs











 5/11/20 5/11/20 5/11/20 5/11/20





 00:00 01:00 04:00 07:00


 


Temp 36.5  37.0 


 


Pulse 85 70 65 70


 


Resp 19  20 


 


B/P (MAP) 158/90 (112)  151/88 (109) 


 


Pulse Ox 96  96 


 


O2 Delivery Room Air  Room Air 


 


    





 5/11/20   





 07:56   


 


Temp 36.5   


 


Pulse 78   


 


Resp 16   


 


B/P (MAP) 160/91 (114)   


 


Pulse Ox 99   


 


O2 Delivery Room Air   














 5/11/20





 00:00


 


Intake Total 2080 ml


 


Output Total 300 ml


 


Balance 1780 ml








Constitutional:  AAO x 3, well-developed, well-nourished


Respiratory:  No accessory muscle use; other (Fair air entry, diminished breath 

sound at both bases)


Cardiovascular:  regular rate-rhythm, S1 and S2, systolic murmur (soft EMILY at 

card base)


Gastrointestional:  No tender; soft, distended (mildly distended), audible bowel

sounds


Extremities:  other (bilat upper and lower limb swelling)


Neurologic/Psychiatric:  oriented x 3, other (Moves all limbs equally)


Skin:  No rash on exposed areas, No ulcerations on exposed areas





Results/Procedures:


Labs


Laboratory Tests


5/10/20 11:08: Glucometer 186H


5/10/20 15:41: Glucometer 153H


5/10/20 20:08: Glucometer 184H


5/11/20 04:15: 


White Blood Count 7.8, Red Blood Count 2.55L, Hemoglobin 7.3L, Hematocrit 23L, 

Mean Corpuscular Volume 89, Mean Corpuscular Hemoglobin 29, Mean Corpuscular 

Hemoglobin Concent 32, Red Cell Distribution Width 15.6H, Platelet Count 295, 

Mean Platelet Volume 9.8, Neutrophils (%) (Auto) 53, Lymphocytes (%) (Auto) 30, 

Monocytes (%) (Auto) 10, Eosinophils (%) (Auto) 5, Basophils (%) (Auto) 1, 

Neutrophils # (Auto) 4.2, Lymphocytes # (Auto) 2.4, Monocytes # (Auto) 0.8, 

Eosinophils # (Auto) 0.4H, Basophils # (Auto) 0.1, Sodium Level 136, Potassium 

Level 5.0, Chloride Level 108H, Carbon Dioxide Level 18L, Anion Gap 10, Blood 

Urea Nitrogen 67H, Creatinine 4.40H, Estimat Glomerular Filtration Rate 15, 

BUN/Creatinine Ratio 15, Glucose Level 67L, Calcium Level 7.4L








A/P:


Assessment:





Anasarca due to nephrotic syndrome and renal failure





Nephrotic syndrome and acute-on-chronic renal failure. CKD stage 4-5 - follows 

with nephrologist Dr. Lluvia Mchugh. This is being managed by Dr Parks 

during this hospitalization





Hyperkalemia and acidosis - secondary to renal failure - reported to be chronic.

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 This is being managed 

by Dr Parks





Chronic systolic CHF, likely related to hypertensive cardiovascular disease. 

Echo of 5/8/20: LVEF 35-40%, small to mod pericardial eff w/o any evidence of 

hemodynamic significance, pleural eff, mod MR, mod TR, pulmonary hypertension 

with PASP 52 mmHg. Last echo of 5/11/20 shows LVEF 50%, small amount of pericard

fluid (hemodynamically insignificant) and L pleural effusion





HTN - uncontrolled





IDDM (diagnosed age 24)





HLD per pt report - reported intolerance to statin





H/o asthma





H/o left retinal detachment, recent surgery. H/o "chronic right eye floater" per

pt report





Reports left Charcot foot





Tobaccoism - quit "years ago"





Chronic anemia - probably related to chronic renal failure - managed by the Med 

Svce


Plan:





* Complex management due to multiple comorbidities


* Medical Svce managing nephrotic syndrome, renal failure, acidosis, DM II


* Increase bb for bp control


* For ac-on-ch systolic CHF, continue bb. Not suitable for ACE-inhib or ARB or 

  Entresto, given ac-on-ch renal failure


* Diuretics as needed and tolerated


* May need transfer for dialysis / ultrafiltration to treat anasarca if 

  unresponsive to diuretics. I have reviewed all of this with the Medical 

  Service











VICTOR MANUEL OCHOA MD FACP [facility]   May 11, 2020 09:31

## 2020-05-11 NOTE — NUR
CM/SS:  Visited with pt as to his plan for discharge and his ability to afford diabetic 
supplies 



Plan:  Undetermined at this time.  Pt should return home and have follow up appointments 
with no other services.



Summary:  Pt has a history of having medical issues, related to his blood sugar and kidney 
function.  Pt reports he has been on a insulin pump in the past and that he was working full 
time in [place] at [employer], and he moved to [place] and lost his insurance and has 
been having trouble with his blood sugars.  He talked also about filing for disability.  He 
reports having a kidney doctor in [place].  Pt request information about the financial 
services application and wanting to know if it is still good after 6 months.  This worker 
will need to check with that department. Pt reports he is able to afford his diabetic 
supplies as he is getting them through [facility].  Pt also shares that he was 
recently signed up with  [facility], and they have assistance through there as well, 
for medical supplies.  Pt aware that this worker will check on the financial paperwork.  Pt 
reports he may move back to his hometown in [place]  his family request that he move home, to 
be more involved in his care.  Pt is encouraged to transfer all of his medical stuff if he 
should move to [place].    He is undecided on this at this time.  This worker will follow up.

## 2020-05-11 NOTE — PROGRESS NOTE
Subjective


Subjective/Events-last exam


Patient states that his lip started draining pus this AM. States that he is not 

sure what happened but it was normal last night and now it is huge. No other 

complaints this AM. Tolerating PO diet


Review of Systems


HEENT:  Other (lip swelling and pain)


Pulmonary:  No Dyspnea, No Cough


Cardiovascular:  No: Chest Pain, Palpitations


Gastrointestinal:  Nausea, Diarrhea





Objective


Exam


Last Set of Vital Signs





Vital Signs








  Date Time  Temp Pulse Resp B/P (MAP) Pulse Ox O2 Delivery O2 Flow Rate FiO2


 


20 12:09  70      


 


20 11:52 36.7  18 158/93 (114) 96 Room Air  


 


20 18:54        21





Capillary Refill : Less Than 3 SecondsLess Than 3 Seconds


I&O











Intake and Output 


 


 20





 00:00


 


Intake Total 2880 ml


 


Output Total 300 ml


 


Balance 2580 ml


 


 


 


Intake Oral 2880 ml


 


Output Urine Total 300 ml


 


# Voids 4


 


# Bowel Movements 1








General:  Alert, Oriented X3, Cooperative, No Acute Distress


HEENT:  Other (Swelling lower lip with draining abscess of purulent material, 

ttp)


Lungs:  Clear to Auscultation, Normal Air Movement


Heart:  Regular Rate, No Murmurs


Abdomen:  Normal Bowel Sounds, Soft, No Tenderness, No Masses


Extremities:  Other (2+ pitting edema)





Results/Procedures


Lab


Laboratory Tests


5/10/20 15:41: Glucometer 153H


5/10/20 20:08: Glucometer 184H


20 04:15: 


White Blood Count 7.8, Red Blood Count 2.55L, Hemoglobin 7.3L, Hematocrit 23L, 

Mean Corpuscular Volume 89, Mean Corpuscular Hemoglobin 29, Mean Corpuscular 

Hemoglobin Concent 32, Red Cell Distribution Width 15.6H, Platelet Count 295, 

Mean Platelet Volume 9.8, Neutrophils (%) (Auto) 53, Lymphocytes (%) (Auto) 30, 

Monocytes (%) (Auto) 10, Eosinophils (%) (Auto) 5, Basophils (%) (Auto) 1, 

Neutrophils # (Auto) 4.2, Lymphocytes # (Auto) 2.4, Monocytes # (Auto) 0.8, 

Eosinophils # (Auto) 0.4H, Basophils # (Auto) 0.1, Sodium Level 136, Potassium 

Level 5.0, Chloride Level 108H, Carbon Dioxide Level 18L, Anion Gap 10, Blood 

Urea Nitrogen 67H, Creatinine 4.40H, Estimat Glomerular Filtration Rate 15, 

BUN/Creatinine Ratio 15, Glucose Level 67L, Calcium Level 7.4L


20 11:13: Glucometer 342H


Radiology


NAME:   ROBLES OLIVIA


[facility] REC#:   [id number]


ACCOUNT#:   [id number]


PT STATUS:   REG ER


:   1980


PHYSICIAN:   ROWENA JACOME


ADMIT DATE:   20/ER


***Signed***


Date of Exam:20





CHEST 1 VIEW, AP/PA ONLY








INDICATION: Abnormal blood work, pleural effusion.





COMPARISON: 2020.





TECHNIQUE: Single frontal radiograph of the chest dated May 8,


2020.





FINDINGS: The cardiac silhouette is enlarged, similar to the


prior examination. No significant pulmonary vascular congestion.


Moderate right and small left bibasilar pleural-parenchymal


opacities are present, increased from prior examination. Upper


lungs are clear. No pneumothorax. No acute osseous abnormality.





IMPRESSION: Moderate right and small left bibasilar


pleural-parenchymal opacities, increased since the prior exam.


These are felt related to a combination of pleural fluid with


adjacent atelectasis and/or infiltrate.





Persistent enlargement of the cardiac silhouette without


pulmonary vascular congestion.





Dictated by: 





  Dictated on workstation # [id number]








Dict:   20 1327


Trans:   20 1525


[facility] [id number]





Interpreted by:     JOSE GUADALUPE BAUTISTA MD


Electronically signed by: JOSE GUADALUPE BAUTISTA MD 20 1525





Assessment/Plan


Assessment/Plan





(1) Hyperkalemia


Status:  Acute


Assessment & Plan:  : Patient has received Kayexalate, trending down, daily 

CMPs





(2) Acute on chronic renal failure


Status:  Acute


Assessment & Plan:  : Follows with [facility] and has appt on Wed


Qualifiers:  


   Qualified Codes:  N17.9 - Acute kidney failure, unspecified; N18.9 - Chronic 

kidney disease, unspecified


(3) Anemia in chronic kidney disease (CKD)


Status:  Chronic


Assessment & Plan:  : Will get iron studies


Qualifiers:  


   Qualified Codes:  N18.4 - Chronic kidney disease, stage 4 (severe); D63.1 - 

Anemia in chronic kidney disease


(4) Volume overload


Status:  Acute


Qualifiers:  


   Qualified Codes:  E87.70 - Fluid overload, unspecified


(5) Lip abscess


Status:  Acute


Assessment & Plan:  : currently draining, culture done this AM, Started on 

renally dosed augmentin








Clinical Quality Measures


DVT/VTE Risk/Contraindication:


Risk Factor Score Per Nursin


RFS Level Per Nursing on Admit:  1=Low/No VTE PPX











ANASTASIA JIMENEZ MD               May 11, 2020 15:24

## 2020-05-12 VITALS — SYSTOLIC BLOOD PRESSURE: 165 MMHG | DIASTOLIC BLOOD PRESSURE: 92 MMHG

## 2020-05-12 VITALS — DIASTOLIC BLOOD PRESSURE: 89 MMHG | SYSTOLIC BLOOD PRESSURE: 147 MMHG

## 2020-05-12 VITALS — DIASTOLIC BLOOD PRESSURE: 76 MMHG | SYSTOLIC BLOOD PRESSURE: 117 MMHG

## 2020-05-12 VITALS — SYSTOLIC BLOOD PRESSURE: 159 MMHG | DIASTOLIC BLOOD PRESSURE: 84 MMHG

## 2020-05-12 VITALS — SYSTOLIC BLOOD PRESSURE: 135 MMHG | DIASTOLIC BLOOD PRESSURE: 79 MMHG

## 2020-05-12 LAB
BUN/CREAT SERPL: 16
CALCIUM SERPL-MCNC: 7.5 MG/DL (ref 8.5–10.1)
CHLORIDE SERPL-SCNC: 108 MMOL/L (ref 98–107)
CO2 SERPL-SCNC: 19 MMOL/L (ref 21–32)
CREAT SERPL-MCNC: 4.74 MG/DL (ref 0.6–1.3)
ERYTHROCYTE [DISTWIDTH] IN BLOOD BY AUTOMATED COUNT: 15.8 % (ref 10–14.5)
GFR SERPLBLD BASED ON 1.73 SQ M-ARVRAT: 14 ML/MIN
GLUCOSE SERPL-MCNC: 61 MG/DL (ref 70–105)
HCT VFR BLD CALC: 23 % (ref 40–54)
HGB BLD-MCNC: 7.4 G/DL (ref 13.3–17.7)
MAGNESIUM SERPL-MCNC: 1.8 MG/DL (ref 1.6–2.4)
MCH RBC QN AUTO: 28 PG (ref 25–34)
MCHC RBC AUTO-ENTMCNC: 32 G/DL (ref 32–36)
MCV RBC AUTO: 89 FL (ref 80–99)
PLATELET # BLD: 310 10^3/UL (ref 130–400)
PMV BLD AUTO: 9.5 FL (ref 7.4–10.4)
POTASSIUM SERPL-SCNC: 5.2 MMOL/L (ref 3.6–5)
SODIUM SERPL-SCNC: 137 MMOL/L (ref 135–145)
WBC # BLD AUTO: 8.1 10^3/UL (ref 4.3–11)

## 2020-05-12 RX ADMIN — SODIUM POLYSTYRENE SULFONATE SCH GM: 15 SUSPENSION ORAL; RECTAL at 08:43

## 2020-05-12 RX ADMIN — AMLODIPINE BESYLATE SCH MG: 5 TABLET ORAL at 08:42

## 2020-05-12 RX ADMIN — FUROSEMIDE SCH MG: 10 INJECTION, SOLUTION INTRAVENOUS at 07:00

## 2020-05-12 RX ADMIN — Medication SCH ML: at 05:53

## 2020-05-12 RX ADMIN — ASPIRIN SCH MG: 81 TABLET ORAL at 08:42

## 2020-05-12 RX ADMIN — HYDRALAZINE HYDROCHLORIDE SCH MG: 25 TABLET ORAL at 05:58

## 2020-05-12 RX ADMIN — INSULIN ASPART SCH UNIT: 100 INJECTION, SOLUTION INTRAVENOUS; SUBCUTANEOUS at 11:18

## 2020-05-12 RX ADMIN — SODIUM BICARBONATE TAB 650 MG SCH MG: 650 TAB at 08:42

## 2020-05-12 RX ADMIN — INSULIN ASPART SCH UNIT: 100 INJECTION, SOLUTION INTRAVENOUS; SUBCUTANEOUS at 05:53

## 2020-05-12 RX ADMIN — METOPROLOL TARTRATE SCH MG: 50 TABLET, FILM COATED ORAL at 08:42

## 2020-05-12 NOTE — DISCHARGE SUMMARY
Discharge [facility]


Reconcile Patient Problems


Problems Reviewed?:  Yes





Discharge Medications


New, Converted or Re-Newed RX:  Transmitted to Pharmacy


New Medications:  


Amlodipine Besylate (Amlodipine Besylate) 5 Mg Tablet


5 MG PO DAILY, #30 TAB





Clindamycin HCl (Clindamycin HCl) 150 Mg Capsule


150 MG PO BID, #10 CAP


Renally dosed


Sodium Bicarbonate (Sodium Bicarbonate) 650 Mg Tablet


650 MG PO BID, #60 TAB





 


Changed Medications:  


Furosemide (Furosemide) 20 Mg Tablet


40 MG PO BID for 10 Days, #40 TAB (Changed from: 20 MG)


LAST FILLED 03- #60/30 DAY SUPPLY


 


Continued Medications:  


Albuterol Sulfate (Proair Hfa) 1 Puff Puff


2 PUFF IH Q4H PRN for WEAKNESS, #1 PUFF


1 PUFF = 90 MCG


Aspirin (Aspir 81) 81 Mg Tablet.dr


81 MG PO DAILY, TAB





Hydralazine HCl (Hydralazine HCl) 50 Mg Tablet


50 MG PO Q8H, TAB


LAST FILLED 03- #90/30 DAY SUPPLY


Insulin Aspart (Novolog) 100 Unit/1 Ml Susp


0 SQ UD, EACH





Insulin Glargine,Hum.rec.anlog (Toujeo Solostar) 300 Unit/1 Ml Insuln.pen


15 UNIT SQ DAILY, EA





Metoprolol Succinate (Metoprolol Succinate) 100 Mg Tab.er.24h


100 MG PO TID, TAB





Multivitamin (Daily Value) 1 Each Tablet


1 EACH PO DAILY, TAB





Niacinamide (Niacin) 500 Mg Tablet


500 MG PO DAILY, TAB





Omega 3 Polyunsat Fatty Acids (Fish Oil 1,000 mg Capsule) 1,000 Mg Cap


1000 MG PO DAILY, CAP











Patient Instructions


Goal/Follow Up Appt:  


Make sure to keep your appt tomorrow with your Nephrologist





F.u appt with Dr Cuauhtemoc Chew May 19th @ 300 PM [facility]





Activity & Diet


Discharge Diet:  Cardiac Diet


Activity as Tolerated:  Yes





Copy


Copies To 1:   NISHA CARLIN MD, HOLLY R MD               May 12, 2020 11:34

## 2020-05-12 NOTE — NUR
CM/SS:  Visited with pt as to plan for discharge 



Plan:  Pt will return home, likely discharge today, with no identified services.  Pt to 
follow up with his nephrologist with appt on 5/13/20 in [place] 



Summary:  Follow up with pt as to his request on if his financial application is still on 
file.  Pt is informed that it has been sent to [place] for approval, and is still current 
and no need to complete any other information.  Pt thanked this worker for the information.

## 2020-05-12 NOTE — DISCHARGE SUMMARY
Diagnosis/Chief Complaint


Date of Admission


May 8, 2020 at 15:00


Date of Discharge





Discharge Diagnosis


Problems/Diagnosis:  


(1) Hyperkalemia


Assessment & Plan:  : Patient has received Kayexalate, trending down, daily 

CMPs


Status:  Acute


(2) Acute on chronic renal failure


Assessment & Plan:  : Follows with [facility] and has appt on Wed


Qualifiers:  


   Qualified Codes:  N17.9 - Acute kidney failure, unspecified; N18.9 - Chronic 

kidney disease, unspecified


Status:  Acute


(3) Anemia in chronic kidney disease (CKD)


Assessment & Plan:  : Will get iron studies


Qualifiers:  


   Qualified Codes:  N18.4 - Chronic kidney disease, stage 4 (severe); D63.1 - 

Anemia in chronic kidney disease


Status:  Chronic


(4) Volume overload


Qualifiers:  


   Qualified Codes:  E87.70 - Fluid overload, unspecified


Status:  Acute


(5) Lip abscess


Assessment & Plan:  : currently draining, culture done this AM, Started on 

renally dosed augmentin


Status:  Acute





Discharge Summary-Simple/Stand


Consultations


Netta Posadas


Discharge Physical Examination


Allergies:  


Coded Allergies:  


     Statins-Hmg-Coa Reductase Inhibitor (Unverified  Adverse Reaction, Unknown,

20)


     latex (Unverified  Adverse Reaction, Unknown, 20)


Vitals & I&Os





Vital Sign - Last 12Hours








  Date Time  Temp Pulse Resp B/P (MAP) Pulse Ox O2 Delivery O2 Flow Rate FiO2


 


20 08:00 36.7 70 16 147/89 (108) 96 Room Air  


 


20 18:54        21














Intake and Output 


 


 20





 00:00


 


Intake Total 1780 ml


 


Balance 1780 ml











Hospital Course


See final discharge diagnosis.


Radiology Reviewed


NAME:   ROBLES OLIVIA


[facility] REC#:   [id number]


ACCOUNT#:   [id number]


PT STATUS:   REG ER


:   1980


PHYSICIAN:   ROWENA JACOME


ADMIT DATE:   20/ER


***Signed***


Date of Exam:20





CHEST 1 VIEW, AP/PA ONLY








INDICATION: Abnormal blood work, pleural effusion.





COMPARISON: 2020.





TECHNIQUE: Single frontal radiograph of the chest dated May 8,


2020.





FINDINGS: The cardiac silhouette is enlarged, similar to the


prior examination. No significant pulmonary vascular congestion.


Moderate right and small left bibasilar pleural-parenchymal


opacities are present, increased from prior examination. Upper


lungs are clear. No pneumothorax. No acute osseous abnormality.





IMPRESSION: Moderate right and small left bibasilar


pleural-parenchymal opacities, increased since the prior exam.


These are felt related to a combination of pleural fluid with


adjacent atelectasis and/or infiltrate.





Persistent enlargement of the cardiac silhouette without


pulmonary vascular congestion.





Dictated by: 





  Dictated on workstation # [id number]








Dict:   20 1327


Trans:   20 1525


[facility] [id number]





Interpreted by:     JOSE GUADALUPE BAUTISTA MD


Electronically signed by: JOSE GUADALUPE BAUTISTA MD 20 1525





Discharge


Instructions to patient/family


Please see electronic discharge instructions given to patient.


Discharge Medications


Reviewed and agree with Discharge Medication list on patient's Discharge 

Instruction sheet





Clinical Quality Measures


DVT/VTE Risk/Contraindication:


Risk Factor Score Per Nursin


RFS Level Per Nursing on Admit:  1=Low/No VTE PPX











ANASTASIA JIMENEZ MD               May 12, 2020 11:26

## 2024-09-13 NOTE — PROGRESS NOTE - CARDIOLOGY
Cardiology SOAP Progress Note


Subjective:


Sitting up in bed.  States he is hoping to go home today so he can go to his 

nephrology appt tomorrow in [place].  Feels swelling has improved.  No c/o SOB 

this morning.  No c/o CP.





Objective:


I&O/Vital Signs











 5/12/20 5/12/20 5/12/20 5/12/20





 00:24 01:00 04:00 07:00


 


Temp 37.1  36.9 


 


Pulse 64 64 65 65


 


Resp 20  20 


 


B/P (MAP) 135/79 (97)  159/84 (109) 


 


Pulse Ox 96  95 


 


O2 Delivery Room Air  Room Air 


 


    





 5/12/20 5/12/20 5/12/20 





 07:18 08:00 08:00 


 


Temp   36.7 


 


Pulse   70 


 


Resp   16 


 


B/P (MAP)   147/89 (108) 


 


Pulse Ox  96 96 


 


O2 Delivery Room Air Room Air Room Air 














 5/12/20





 00:00


 


Intake Total 1780 ml


 


Balance 1780 ml








Constitutional:  AAO x 3, well-developed, well-nourished


Respiratory:  No accessory muscle use; other (Fair air entry, diminished breath 

sound at both bases)


Cardiovascular:  regular rate-rhythm, S1 and S2, systolic murmur (soft EMILY at 

card base)


Gastrointestional:  No tender; soft, distended (mildly distended), audible bowel

sounds


Extremities:  other (bilat upper and lower limb swelling - improving)


Neurologic/Psychiatric:  oriented x 3, other (Moves all limbs equally)


Skin:  No rash on exposed areas, No ulcerations on exposed areas





Results/Procedures:


Labs


Laboratory Tests


5/11/20 11:13: Glucometer 342H


5/11/20 15:38: Glucometer 252H


5/11/20 20:42: Glucometer 148H


5/12/20 04:35: Glucometer 72


5/12/20 05:03: 


Sodium Level 137, Potassium Level 5.2H, Chloride Level 108H, Carbon Dioxide 

Level 19L, Anion Gap 10, Blood Urea Nitrogen 74H, Creatinine 4.74H, Estimat 

Glomerular Filtration Rate 14, BUN/Creatinine Ratio 16, Glucose Level 61L, 

Calcium Level 7.5L, Magnesium Level 1.8


5/12/20 05:12: 


White Blood Count 8.1, Red Blood Count 2.61L, Hemoglobin 7.4L, Hematocrit 23L, 

Mean Corpuscular Volume 89, Mean Corpuscular Hemoglobin 28, Mean Corpuscular 

Hemoglobin Concent 32, Red Cell Distribution Width 15.8H, Platelet Count 310, 

Mean Platelet Volume 9.5





Microbiology


5/11/20 Gram Stain - Final, Resulted


          


5/11/20 Anaerobic Culture, Resulted


          Pending


5/11/20 Wound Culture - Preliminary, Resulted


          Staphylococcus aureus





Laboratory Tests


5/11/20 04:15








5/12/20 05:03








5/12/20 05:12











A/P:


Assessment:





Anasarca due to nephrotic syndrome and renal failure





Nephrotic syndrome and acute-on-chronic renal failure. CKD stage 4-5 - follows 

with nephrologist Dr. Lluvia Mchugh. This is being managed by Medical 

services (Dr Parks/Dr. Jaime) during this hospitalization





Hyperkalemia and acidosis - secondary to renal failure - reported to be chronic.

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 This is being managed 

by Medical services (Dr. Parks/Dr. Jaime)





Chronic systolic CHF, likely related to hypertensive cardiovascular disease. 

Echo of 5/8/20: LVEF 35-40%, small to mod pericardial eff w/o any evidence of 

hemodynamic significance, pleural eff, mod MR, mod TR, pulmonary hypertension 

with PASP 52 mmHg. Last echo of 5/11/20 shows LVEF 50%, small amount of pericard

fluid (hemodynamically insignificant) and L pleural effusion





HTN - uncontrolled





IDDM (diagnosed age 24)





HLD per pt report - reported intolerance to statin





H/o asthma





H/o left retinal detachment, recent surgery. H/o "chronic right eye floater" per

pt report





Reports left Charcot foot





Tobaccoism - quit "years ago"





Chronic anemia - probably related to chronic renal failure - managed by the Med 

Svce


Plan:





* Complex management due to multiple comorbidities


* Medical Svce managing nephrotic syndrome, renal failure, acidosis, DM II


* BP improved following increased dose of BB


* For ac-on-ch systolic CHF, continue bb. Not suitable for ACE-inhib or ARB or 

  Entresto, given ac-on-ch renal failure


* Diuretics as needed and tolerated


* May need transfer for dialysis / ultrafiltration to treat anasarca if 

  unresponsive to diuretics. I have reviewed all of this with the Medical 

  Service











GOYO MICHAELS           May 12, 2020 09:43 No